# Patient Record
Sex: FEMALE | Race: WHITE | NOT HISPANIC OR LATINO | Employment: FULL TIME | URBAN - METROPOLITAN AREA
[De-identification: names, ages, dates, MRNs, and addresses within clinical notes are randomized per-mention and may not be internally consistent; named-entity substitution may affect disease eponyms.]

---

## 2023-10-12 ENCOUNTER — PATIENT ROUNDING (BHMG ONLY) (OUTPATIENT)
Dept: INTERNAL MEDICINE | Facility: CLINIC | Age: 23
End: 2023-10-12
Payer: COMMERCIAL

## 2023-10-12 ENCOUNTER — OFFICE VISIT (OUTPATIENT)
Dept: INTERNAL MEDICINE | Facility: CLINIC | Age: 23
End: 2023-10-12
Payer: COMMERCIAL

## 2023-10-12 VITALS
RESPIRATION RATE: 18 BRPM | SYSTOLIC BLOOD PRESSURE: 122 MMHG | OXYGEN SATURATION: 98 % | TEMPERATURE: 98.1 F | DIASTOLIC BLOOD PRESSURE: 72 MMHG | BODY MASS INDEX: 30.14 KG/M2 | HEIGHT: 63 IN | HEART RATE: 84 BPM | WEIGHT: 170.13 LBS

## 2023-10-12 DIAGNOSIS — Z3A.11 11 WEEKS GESTATION OF PREGNANCY: Primary | ICD-10-CM

## 2023-10-12 DIAGNOSIS — J30.9 ALLERGIC RHINITIS, UNSPECIFIED SEASONALITY, UNSPECIFIED TRIGGER: ICD-10-CM

## 2023-10-12 PROBLEM — Z82.3 FAMILY HISTORY OF STROKE: Status: ACTIVE | Noted: 2023-10-12

## 2023-10-12 PROBLEM — Z83.3 FAMILY HISTORY OF DIABETES MELLITUS: Status: ACTIVE | Noted: 2023-10-12

## 2023-10-12 NOTE — PROGRESS NOTES
"Chief Complaint  Establish Care (New patient, general check up, currently pregnant and has an appointment scheduled for obgyn. )    Subjective       Elisha Funez presents to [facility]    HPI   Presenting to [facility] care.     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    Currently 11 weeks pregnant. Has appointment with OB, Dr. Hi, on 10/30/23. Will be 13 weeks at OB appointment. No concerns currently regarding pregnancy.         Objective     Vitals:    10/12/23 1030   BP: 122/72   BP Location: Left arm   Patient Position: Sitting   Cuff Size: Adult   Pulse: 84   Resp: 18   Temp: 98.1 øF (36.7 øC)   TempSrc: Temporal   SpO2: 98%   Weight: 77.2 kg (170 lb 2 oz)   Height: 160 cm (63\")      Wt Readings from Last 3 Encounters:   10/12/23 77.2 kg (170 lb 2 oz)   09/13/23 75.3 kg (166 lb)      BP Readings from Last 3 Encounters:   10/12/23 122/72   09/13/23 123/83        Body mass index is 30.14 kg/mý.    BMI is >= 30 and <35. (Class 1 Obesity). The following options were offered after discussion;: exercise counseling/recommendations and nutrition counseling/recommendations       Physical Exam  Vitals reviewed.   Constitutional:       Appearance: Normal appearance. She is well-developed.   HENT:      Head: Normocephalic and atraumatic.      Mouth/Throat:      Pharynx: No oropharyngeal exudate.   Eyes:      Conjunctiva/sclera: Conjunctivae normal.      Pupils: Pupils are equal, round, and reactive to light.   Neck:      Thyroid: No thyromegaly or thyroid tenderness.   Cardiovascular:      Rate and Rhythm: Normal rate and regular rhythm.      Heart sounds: No murmur heard.     No friction rub. No gallop.   Pulmonary:      Effort: Pulmonary effort is normal.      Breath sounds: Normal breath sounds. No wheezing or rhonchi.   Lymphadenopathy:      Cervical: No cervical adenopathy.   Skin:     General: Skin is warm and dry.   Neurological:      Mental Status: She is alert " and oriented to person, place, and time.   Psychiatric:         Mood and Affect: Affect normal.          Result Review :   The following data was reviewed by: Lela Mckee MD on 10/12/2023:        Procedures    Assessment and Plan   Diagnoses and all orders for this visit:    1. 11 weeks gestation of pregnancy (Primary)  Assessment & Plan:  Has initial appointment with OB, Dr. Hi, on 10/30/23.   Counseled to continue prenatal vitamin      2. Allergic rhinitis, unspecified seasonality, unspecified trigger  Assessment & Plan:  Can continue Claritin as needed for symptoms            Follow Up   Return in about 1 year (around 10/12/2024) for Annual physical, or sooner if needed.  Patient was given instructions and counseling regarding her condition or for health maintenance advice. Please see specific information pulled into the AVS if appropriate.

## 2023-10-25 NOTE — PROGRESS NOTES
A My-Chart message has been sent to the patient for [place] [place] [facility]

## 2023-10-30 ENCOUNTER — INITIAL PRENATAL (OUTPATIENT)
Dept: OBSTETRICS AND GYNECOLOGY | Facility: CLINIC | Age: 23
End: 2023-10-30
Payer: COMMERCIAL

## 2023-10-30 VITALS — DIASTOLIC BLOOD PRESSURE: 84 MMHG | WEIGHT: 170 LBS | SYSTOLIC BLOOD PRESSURE: 126 MMHG | BODY MASS INDEX: 30.11 KG/M2

## 2023-10-30 DIAGNOSIS — Z34.00 SUPERVISION OF NORMAL FIRST PREGNANCY, ANTEPARTUM: Primary | ICD-10-CM

## 2023-10-30 DIAGNOSIS — Z83.3 FAMILY HISTORY OF DIABETES MELLITUS: ICD-10-CM

## 2023-10-30 PROBLEM — Z34.80 SUPERVISION OF OTHER NORMAL PREGNANCY, ANTEPARTUM: Status: ACTIVE | Noted: 2023-10-30

## 2023-10-30 LAB
ABO GROUP BLD: NORMAL
B-HCG UR QL: POSITIVE
BASOPHILS # BLD AUTO: 0.04 10*3/MM3 (ref 0–0.2)
BASOPHILS NFR BLD AUTO: 0.3 % (ref 0–1.5)
BLD GP AB SCN SERPL QL: NEGATIVE
C TRACH RRNA CVX QL NAA+PROBE: NOT DETECTED
DEPRECATED RDW RBC AUTO: 39.6 FL (ref 37–54)
EOSINOPHIL # BLD AUTO: 0.25 10*3/MM3 (ref 0–0.4)
EOSINOPHIL NFR BLD AUTO: 2.1 % (ref 0.3–6.2)
ERYTHROCYTE [DISTWIDTH] IN BLOOD BY AUTOMATED COUNT: 13.1 % (ref 12.3–15.4)
EXPIRATION DATE: ABNORMAL
GLUCOSE UR STRIP-MCNC: NEGATIVE MG/DL
HBA1C MFR BLD: 4.6 % (ref 4.8–5.6)
HBV SURFACE AG SERPL QL IA: NORMAL
HCT VFR BLD AUTO: 38.6 % (ref 34–46.6)
HCV AB SER DONR QL: NORMAL
HGB BLD-MCNC: 13.6 G/DL (ref 12–15.9)
HIV1+2 AB SER QL: NORMAL
IMM GRANULOCYTES # BLD AUTO: 0.06 10*3/MM3 (ref 0–0.05)
IMM GRANULOCYTES NFR BLD AUTO: 0.5 % (ref 0–0.5)
INTERNAL NEGATIVE CONTROL: ABNORMAL
INTERNAL POSITIVE CONTROL: ABNORMAL
LYMPHOCYTES # BLD AUTO: 1.58 10*3/MM3 (ref 0.7–3.1)
LYMPHOCYTES NFR BLD AUTO: 13.6 % (ref 19.6–45.3)
Lab: ABNORMAL
MCH RBC QN AUTO: 29.8 PG (ref 26.6–33)
MCHC RBC AUTO-ENTMCNC: 35.2 G/DL (ref 31.5–35.7)
MCV RBC AUTO: 84.5 FL (ref 79–97)
MONOCYTES # BLD AUTO: 0.45 10*3/MM3 (ref 0.1–0.9)
MONOCYTES NFR BLD AUTO: 3.9 % (ref 5–12)
N GONORRHOEA RRNA SPEC QL NAA+PROBE: NOT DETECTED
NEUTROPHILS NFR BLD AUTO: 79.6 % (ref 42.7–76)
NEUTROPHILS NFR BLD AUTO: 9.27 10*3/MM3 (ref 1.7–7)
NRBC BLD AUTO-RTO: 0 /100 WBC (ref 0–0.2)
PLATELET # BLD AUTO: 300 10*3/MM3 (ref 140–450)
PMV BLD AUTO: 10 FL (ref 6–12)
PROT UR STRIP-MCNC: NEGATIVE MG/DL
RBC # BLD AUTO: 4.57 10*6/MM3 (ref 3.77–5.28)
RH BLD: POSITIVE
T PALLIDUM IGG SER QL: NORMAL
WBC NRBC COR # BLD: 11.65 10*3/MM3 (ref 3.4–10.8)

## 2023-10-30 PROCEDURE — 87591 N.GONORRHOEAE DNA AMP PROB: CPT | Performed by: OBSTETRICS & GYNECOLOGY

## 2023-10-30 PROCEDURE — 86780 TREPONEMA PALLIDUM: CPT | Performed by: OBSTETRICS & GYNECOLOGY

## 2023-10-30 PROCEDURE — 80307 DRUG TEST PRSMV CHEM ANLYZR: CPT | Performed by: OBSTETRICS & GYNECOLOGY

## 2023-10-30 PROCEDURE — 86850 RBC ANTIBODY SCREEN: CPT | Performed by: OBSTETRICS & GYNECOLOGY

## 2023-10-30 PROCEDURE — 86900 BLOOD TYPING SEROLOGIC ABO: CPT | Performed by: OBSTETRICS & GYNECOLOGY

## 2023-10-30 PROCEDURE — 86803 HEPATITIS C AB TEST: CPT | Performed by: OBSTETRICS & GYNECOLOGY

## 2023-10-30 PROCEDURE — 85025 COMPLETE CBC W/AUTO DIFF WBC: CPT | Performed by: OBSTETRICS & GYNECOLOGY

## 2023-10-30 PROCEDURE — 87340 HEPATITIS B SURFACE AG IA: CPT | Performed by: OBSTETRICS & GYNECOLOGY

## 2023-10-30 PROCEDURE — G0432 EIA HIV-1/HIV-2 SCREEN: HCPCS | Performed by: OBSTETRICS & GYNECOLOGY

## 2023-10-30 PROCEDURE — 87086 URINE CULTURE/COLONY COUNT: CPT | Performed by: OBSTETRICS & GYNECOLOGY

## 2023-10-30 PROCEDURE — 87491 CHLMYD TRACH DNA AMP PROBE: CPT | Performed by: OBSTETRICS & GYNECOLOGY

## 2023-10-30 PROCEDURE — 86901 BLOOD TYPING SEROLOGIC RH(D): CPT | Performed by: OBSTETRICS & GYNECOLOGY

## 2023-10-30 PROCEDURE — 83036 HEMOGLOBIN GLYCOSYLATED A1C: CPT | Performed by: OBSTETRICS & GYNECOLOGY

## 2023-10-30 NOTE — ASSESSMENT & PLAN NOTE
MACEY finalize:Estimated Date of Delivery: 5/3/24 based on LMP, confirmed with BSUS at new OB      Genetic testing (NIPS-Quad)/CF/AFP:  ordered    COVID: received, needs booster  Flu: received   Tdap:script 27-36 weeks  RSV: 32-36 weeks    Rhogam    Sterilization:    Anatomy US:ordered  FU US:    EPDS: 6, 0 on #10    PROBLEM LIST/PLAN:   Nulliparity   Family hx Type 2 DM-mother, early GTT recommended

## 2023-10-30 NOTE — PROGRESS NOTES
OBSTETRIC HISTORY AND PHYSICAL     Subjective:  Elisha Patten is a 23 y.o.  at 13w3d  here for her new OB visit. Patient pregnancy is dated by an LMP and confirmed. Patient's pregnancy is complicated by nulliparity.   She is taking her prenatal vitamins.Reports no loss of fluid or vaginal bleeding.No hx of genetic, bleeding, endocrine, chromosome disorder in both patient and partner. No history of multiple gestations, congenital anomalies or mental retardation.    FOB involvement: yes  Pediatrician: yes  Breast/Bottle: undecided   Epidural:  no   Discussed adequate water intake, food guidelines/weight gain, limit caffiene to less than 200mg daily.   Discussed food, activities to avoid. Discussed seatbelt safety.   Reviewed safe meds in pregnancy handout.  Taking PNV: yes  Smoking cessation needed:  no     Reviewed and updated:  OBHx, GYNHx (STDs), PMHx, Medications, Allergies, PSHx, Social Hx, Preventative Hx (PAP), Hx of abuse/safe environment, Vaccine Hx including hx of chickenpox or vaccine, Genetic Hx (pt, FOB, both families).        OB History    Para Term  AB Living   1             SAB IAB Ectopic Molar Multiple Live Births                    # Outcome Date GA Lbr [place]/2nd Weight Sex Delivery Anes PTL Lv   1 Current              Past Medical History:   Diagnosis Date    Allergic     Seasonal allergies     History reviewed. No pertinent surgical history.  Family History   Problem Relation Age of Onset    Hyperlipidemia Father     Diabetes Mother         DMT2    No Known Problems Brother     No Known Problems Sister     No Known Problems Son     No Known Problems Daughter     No Known Problems Paternal Grandfather     No Known Problems Paternal Grandmother     Hyperlipidemia Maternal Grandmother     Hyperlipidemia Maternal Grandfather     Diabetes Paternal Uncle         DMT2    Diabetes Paternal Uncle         DMT1    No Known Problems Cousin     No Known Problems Other     Rheum arthritis Neg  Hx     Osteoarthritis Neg Hx     Asthma Neg Hx     Heart failure Neg Hx     Hypertension Neg Hx     Migraines Neg Hx     Rashes / Skin problems Neg Hx     Seizures Neg Hx     Stroke Neg Hx     Thyroid disease Neg Hx     Breast cancer Neg Hx     Uterine cancer Neg Hx     Ovarian cancer Neg Hx     Colon cancer Neg Hx      Allergies   Allergen Reactions    Penicillins Unknown - High Severity     Pt reports father is allergic to pencillin     Social History     Socioeconomic History    Marital status:    Tobacco Use    Smoking status: Never     Passive exposure: Never    Smokeless tobacco: Never   Vaping Use    Vaping Use: Never used   Substance and Sexual Activity    Alcohol use: Not Currently    Drug use: Never    Sexual activity: Yes     Partners: Male     Birth control/protection: None     Comment:            ROS:  General ROS: negative for - chills or fatigue  Respiratory ROS: negative for - cough or hemoptysis  Cardiovascular ROS: negative for - chest pain or dyspnea on exertion  Gastrointestinal ROS: negative for - abdominal pain or appetite loss  Musculoskeletal ROS: negative for - gait disturbance or joint pain  Neurological ROS: negative for - behavioral changes or bowel and bladder control changes  Dermatological ROS: negative for rashes or lesions     Objective:  Physical Exam:   Vitals:    10/30/23 1458   BP: 126/84       General appearance - alert, well appearing, and in no distress  Mental status - alert, oriented to person, place, and time  Neck- Supple.  No nodularity or enlargement.  Heart- Regular rate and rhythm without murmur, gallop or rub.  Lungs- Clear to auscultation bilaterally, negative W/R/R  Breasts- Deferred to annual  Abdomen- Soft, Gravid uterus, non-tender  Extremeties: Normal ROM, Negative swelling or cyanosis  Neurological: Gait normal, Negative tingling or loss of sensation     +FHTs BSUS     Counseling:   Nutrition discussed, calories, activity/exercise in  pregnancy  Discussed dietary restrictions/safety food preparation in pregnancy  Reviewed what to expect prenatal visits, office providers (female and male) and covering [facility]/Dr. Scott  Appropriate trimester precautions provided, N/V, vag bleeding, cramping  VACCINE importance in pregnancy discussed.  Maternal and fetal risk of not being vaccinated reviewed NLT increased risk maternal/fetal severity of illness/death, PTD, CS, hemorrhage, HTN, possible IUFD.  Significant maternal and fetal/infant benefit w vaccination.  FDA approval and ACOG/SMFM/CDC strong recommendation in pregnancy.  Questions answered.   Questions answered  ANXIETY/DEPRESSION- We discussed treatment options R/B/A/SE/E expectant, THERAPY, SSRI, vs SSRI + Therapy.  Non medical options usually recommended first.  MERRICK reviewed.  Ideally weaning in third tri if possible. Some studies indicate child w increased risk Dep/Anx w SSRI use in preg.  Black box warning.  Recommend avoid abrupt discontinuation.  Discussed healthy weight gain.  Also discussed increased water intake, 200mg of caffeine daily, exercise and healthy eating habits.   Encouraged patient to consider breastfeeding. Discussed that it is recommended by the CDC and WHO that women exclusively breastfeed for the first 6 months and continue to breastfeed up to one year. Discussed the health benefits of breastfeeding, including increased immune systems in infants, reduced risk of food allergies, and reduced risk of chronic disease as an adult. Maternal benefits include more PP weight loss, reduced risk of PP depression, breast/ovarian cancer risk reduced.     ASSESSMENT/PLAN:   Diagnoses and all orders for this visit:    1. Supervision of normal first pregnancy, antepartum (Primary)  -     Hemoglobin A1c  -     Hemoglobinopathy Fractionation Cascade  -     IGP,CtNgTv,rfx Aptima HPV ASCU  -     OB Panel With HIV  -     POC Pregnancy, Urine  -     POC Urinalysis Dipstick  -     Urine  Culture - Urine, Urine, Clean Catch  -     Urine Drug Screen - Urine, Clean Catch  -     US Ob 14 + Weeks Single or First Gestation; Future        Problems Addressed this Visit    None  Visit Diagnoses       Supervision of normal first pregnancy, antepartum    -  Primary    Relevant Orders    Hemoglobin A1c    Hemoglobinopathy Fractionation Cascade    IGP,CtNgTv,rfx Aptima HPV ASCU    OB Panel With HIV    POC Pregnancy, Urine    POC Urinalysis Dipstick    Urine Culture - Urine, Urine, Clean Catch    Urine Drug Screen - Urine, Clean Catch    US Ob 14 + Weeks Single or First Gestation          Diagnoses         Codes Comments    Supervision of normal first pregnancy, antepartum    -  Primary ICD-10-CM: Z34.00  ICD-9-CM: V22.0                     Return in about 4 weeks (around 11/27/2023) for Routine OB visit.    We have gone over the expected prenatal care to include the timing and content of visits including the anatomy ultrasound.  We discussed the content of the anatomy ultrasound and its limitations (the fact that ultrasound in general may not see up to 40% of abnormalities).  I informed her how to contact the office and/or on call person in the event of any problems and encouraged her to do so when she feels it is necessary.  We then spent time answering her questions which she indicated were answered to her satisfaction.    Audrey Hi DO  10/30/2023 15:04 EDT

## 2023-10-31 LAB
AMPHET+METHAMPHET UR QL: NEGATIVE
BACTERIA SPEC AEROBE CULT: NO GROWTH
BARBITURATES UR QL SCN: NEGATIVE
BENZODIAZ UR QL SCN: NEGATIVE
CANNABINOIDS SERPL QL: NEGATIVE
COCAINE UR QL: NEGATIVE
FENTANYL UR-MCNC: NEGATIVE NG/ML
METHADONE UR QL SCN: NEGATIVE
OPIATES UR QL: NEGATIVE
OXYCODONE UR QL SCN: NEGATIVE

## 2023-11-01 PROBLEM — B06.9: Status: ACTIVE | Noted: 2023-11-01

## 2023-11-01 PROBLEM — O98.519: Status: ACTIVE | Noted: 2023-11-01

## 2023-11-01 LAB
HGB A MFR BLD ELPH: 97.2 % (ref 96.4–98.8)
HGB A2 MFR BLD ELPH: 2.8 % (ref 1.8–3.2)
HGB F MFR BLD ELPH: 0 % (ref 0–2)
HGB FRACT BLD-IMP: NORMAL
HGB S MFR BLD ELPH: 0 %
RUBV IGG SERPL IA-ACNC: <0.9 INDEX

## 2023-11-07 LAB
CITATION REF LAB TEST: NORMAL
ETHNIC BACKGROUND STATED: NORMAL
GENE DIS ANL CARRIER INTERP-IMP: NORMAL
GENE STUDIED ID: NORMAL
LAB DIRECTOR NAME PROVIDER: NORMAL
Lab: NORMAL
MOL DX INTERP BLD/T QL: NORMAL
REASON FOR REFERRAL (NARRATIVE): NORMAL
RECOMMENDATION PATIENT DOC-IMP: NORMAL
REF LAB TEST METHOD: NORMAL
SERVICE CMNT-IMP: NORMAL
SPECIMEN SOURCE: NORMAL

## 2023-11-25 NOTE — PROGRESS NOTES
Routine Prenatal Visit     Subjective  Elisha Patten is a 23 y.o.  at 17w3d here for her routine OB visit.   She is taking her prenatal vitamins.Reports no loss of fluid or vaginal bleeding. Patient doing well without any complaints. Pregnancy is complicated by:     Patient Active Problem List   Diagnosis    Family history of diabetes mellitus    Family history of stroke    Allergic rhinitis    11 weeks gestation of pregnancy    Supervision of other normal pregnancy, antepartum    Rubella nonimmune         OB History    Para Term  AB Living   1             SAB IAB Ectopic Molar Multiple Live Births                    # Outcome Date GA Lbr [place]/2nd Weight Sex Delivery Anes PTL Lv   1 Current                    ROS:   General ROS: negative for - chills or fatigue  Respiratory ROS: negative for - cough or hemoptysis  Cardiovascular ROS: negative for - chest pain or dyspnea on exertion  Genito-Urinary ROS: negative for  change in urinary stream, vaginal discharge   Musculoskeletal ROS: negative for - gait disturbance or joint pain  Dermatological ROS: negative for acne,  dry skin or itching    Objective  Physical Exam:   Vitals:    23 1131   BP: 116/70       :   FHT: 110-160 BPM    General appearance - alert, well appearing, and in no distress  Mental status - alert, oriented to person, place, and time  Abdomen- Soft, Gravid uterus, non-tender to palpation  Musculoskeletal: negative for - gait disturbance or joint pain  Extremeties: negative swelling or cyanosis   Dermatological: negative rashes or skin lesions       Assessment/Plan:   Diagnoses and all orders for this visit:    1. Supervision of normal first pregnancy, antepartum (Primary)  -     POC Urinalysis Dipstick    2. Rubella nonimmune    3. Supervision of other normal pregnancy, antepartum  Assessment & Plan:  PROBLEM LIST/PLAN:   Nulliparity   Family hx Type 2 DM-mother, early GTT recommended and ordered  Rubella nonimmune-MMR  PP            Counseling:   Second trimester precautions  Round Ligament Pain:  The uterus has several ligaments which provide support and keep the uterus in place. As the  uterus grows these ligaments are pulled and stretched which often causes sharp stabbing like pain in the inguinal area.   You may find a pregnancy support band helpful. Changing positions may also help. Yoga is a great way to cope with round ligament and low back pain in pregnancy.    Massage may also help with low back pain   Things to Consider at this Point in your Pregnancy:  Some women experience swelling in their feet during pregnancy. Compression stockings may help  Drink plenty of water and stay active   Make sure you are eating frequent small meals, nuts are a wonderful snack to keep with you            Return in about 4 weeks (around 12/25/2023) for Routine OB visit.      We have gone over prenatal care to include the timing and content of visits. I informed her how to contact the office and/or on call person in the event of any problems and encouraged her to do so when she feels it is necessary.  We then spent time answering her questions which she indicated were answered to her satisfaction.    Audrey Hi DO  11/27/2023 11:47 EST

## 2023-11-25 NOTE — ASSESSMENT & PLAN NOTE
PROBLEM LIST/PLAN:   Nulliparity   Family hx Type 2 DM-mother, early GTT recommended and ordered  Rubella nonimmune-MMR PP

## 2023-11-27 ENCOUNTER — TELEPHONE (OUTPATIENT)
Dept: OBSTETRICS AND GYNECOLOGY | Facility: CLINIC | Age: 23
End: 2023-11-27
Payer: COMMERCIAL

## 2023-11-27 ENCOUNTER — ROUTINE PRENATAL (OUTPATIENT)
Dept: OBSTETRICS AND GYNECOLOGY | Facility: CLINIC | Age: 23
End: 2023-11-27
Payer: COMMERCIAL

## 2023-11-27 VITALS — WEIGHT: 172.2 LBS | SYSTOLIC BLOOD PRESSURE: 116 MMHG | DIASTOLIC BLOOD PRESSURE: 70 MMHG | BODY MASS INDEX: 30.5 KG/M2

## 2023-11-27 DIAGNOSIS — O98.519: ICD-10-CM

## 2023-11-27 DIAGNOSIS — B06.9: ICD-10-CM

## 2023-11-27 DIAGNOSIS — Z34.00 SUPERVISION OF NORMAL FIRST PREGNANCY, ANTEPARTUM: Primary | ICD-10-CM

## 2023-11-27 DIAGNOSIS — Z34.80 SUPERVISION OF OTHER NORMAL PREGNANCY, ANTEPARTUM: ICD-10-CM

## 2023-11-27 LAB
GLUCOSE 1H P GLC SERPL-MCNC: 89 MG/DL (ref 65–139)
GLUCOSE UR STRIP-MCNC: NEGATIVE MG/DL
PROT UR STRIP-MCNC: NEGATIVE MG/DL

## 2023-11-27 PROCEDURE — 36415 COLL VENOUS BLD VENIPUNCTURE: CPT | Performed by: OBSTETRICS & GYNECOLOGY

## 2023-11-27 PROCEDURE — 82950 GLUCOSE TEST: CPT | Performed by: OBSTETRICS & GYNECOLOGY

## 2023-11-27 PROCEDURE — 0502F SUBSEQUENT PRENATAL CARE: CPT | Performed by: OBSTETRICS & GYNECOLOGY

## 2023-11-27 NOTE — PATIENT INSTRUCTIONS
Venipuncture Blood Specimen Collection  Venipuncture performed in right arm by Yris Rome with good hemostasis. Patient tolerated the procedure well without complications.   11/27/23   Yris Rome     Patient's first and last name, , procedure, and correct site confirmed prior to the start of procedure. Patient's first and last name, , procedure, and correct site confirmed prior to the start of procedure. Patient's first and last name, , procedure, and correct site confirmed prior to the start of procedure.

## 2023-12-28 ENCOUNTER — ROUTINE PRENATAL (OUTPATIENT)
Dept: OBSTETRICS AND GYNECOLOGY | Facility: CLINIC | Age: 23
End: 2023-12-28
Payer: COMMERCIAL

## 2023-12-28 VITALS — BODY MASS INDEX: 31.11 KG/M2 | WEIGHT: 175.6 LBS | SYSTOLIC BLOOD PRESSURE: 112 MMHG | DIASTOLIC BLOOD PRESSURE: 76 MMHG

## 2023-12-28 DIAGNOSIS — O98.519: ICD-10-CM

## 2023-12-28 DIAGNOSIS — Z34.80 SUPERVISION OF OTHER NORMAL PREGNANCY, ANTEPARTUM: Primary | ICD-10-CM

## 2023-12-28 DIAGNOSIS — B06.9: ICD-10-CM

## 2023-12-28 LAB
GLUCOSE UR STRIP-MCNC: NEGATIVE MG/DL
PROT UR STRIP-MCNC: NEGATIVE MG/DL

## 2023-12-28 PROCEDURE — 0502F SUBSEQUENT PRENATAL CARE: CPT | Performed by: OBSTETRICS & GYNECOLOGY

## 2023-12-28 NOTE — PROGRESS NOTES
Routine Prenatal Visit     Subjective  Elisha Patten is a 23 y.o.  at 21w6d here for her routine OB visit.   She is taking her prenatal vitamins.Reports no loss of fluid or vaginal bleeding. Patient doing well without any complaints. Pregnancy is complicated by:     Patient Active Problem List   Diagnosis    Family history of diabetes mellitus    Family history of stroke    Allergic rhinitis    11 weeks gestation of pregnancy    Supervision of other normal pregnancy, antepartum    Rubella nonimmune         OB History    Para Term  AB Living   1             SAB IAB Ectopic Molar Multiple Live Births                    # Outcome Date GA Lbr [place]/2nd Weight Sex Delivery Anes PTL Lv   1 Current                    ROS:   General ROS: negative for - chills or fatigue  Respiratory ROS: negative for - cough or hemoptysis  Cardiovascular ROS: negative for - chest pain or dyspnea on exertion  Genito-Urinary ROS: negative for  change in urinary stream, vaginal discharge   Musculoskeletal ROS: negative for - gait disturbance or joint pain  Dermatological ROS: negative for acne,  dry skin or itching    Objective  Physical Exam:   Vitals:    23 1110   BP: 112/76       Uterine Size: not examined, US today  FHT: 110-160 BPM    General appearance - alert, well appearing, and in no distress  Mental status - alert, oriented to person, place, and time  Abdomen- Soft, Gravid uterus, non-tender to palpation  Musculoskeletal: negative for - gait disturbance or joint pain  Extremeties: negative swelling or cyanosis   Dermatological: negative rashes or skin lesions     Assessment/Plan:   Diagnoses and all orders for this visit:    1. Supervision of other normal pregnancy, antepartum (Primary)  Assessment & Plan:  PROBLEM LIST/PLAN:   Nulliparity   Family hx Type 2 DM-mother, early GTT normal   Rubella nonimmune-MMR PP    Orders:  -     POC Urinalysis Dipstick  -     US Ob 14 + Weeks Single or First Gestation;  Future    2. Rubella nonimmune          Counseling:   Second trimester precautions  Round Ligament Pain:  The uterus has several ligaments which provide support and keep the uterus in place. As the  uterus grows these ligaments are pulled and stretched which often causes sharp stabbing like pain in the inguinal area.   You may find a pregnancy support band helpful. Changing positions may also help. Yoga is a great way to cope with round ligament and low back pain in pregnancy.    Massage may also help with low back pain   Things to Consider at this Point in your Pregnancy:  Some women experience swelling in their feet during pregnancy. Compression stockings may help  Drink plenty of water and stay active   Make sure you are eating frequent small meals, nuts are a wonderful snack to keep with you            Return in about 4 weeks (around 1/25/2024) for Routine OB visit.      We have gone over prenatal care to include the timing and content of visits. I informed her how to contact the office and/or on call person in the event of any problems and encouraged her to do so when she feels it is necessary.  We then spent time answering her questions which she indicated were answered to her satisfaction.    Audrey Hi DO  12/28/2023 12:10 EST

## 2023-12-28 NOTE — ASSESSMENT & PLAN NOTE
PROBLEM LIST/PLAN:   Nulliparity   Family hx Type 2 DM-mother, early GTT normal   Rubella nonimmune-MMR PP

## 2024-01-15 ENCOUNTER — TELEPHONE (OUTPATIENT)
Dept: OBSTETRICS AND GYNECOLOGY | Facility: CLINIC | Age: 24
End: 2024-01-15
Payer: COMMERCIAL

## 2024-01-15 NOTE — TELEPHONE ENCOUNTER
Caller: Elisha Patten    Relationship to patient: Self    Best call back number: [phone] (home)        OB PT 24 WKS GEST IS ASKING IF SHE CAN TAKE MUCINEX OR ANY OVER THE COUNTER DECONGESTANTS  AND OVER THE COUNTER MEDS FOR SORE THROAT.        OKAY TO MESSAGE VIA [employer].

## 2024-01-26 ENCOUNTER — ROUTINE PRENATAL (OUTPATIENT)
Dept: OBSTETRICS AND GYNECOLOGY | Facility: CLINIC | Age: 24
End: 2024-01-26
Payer: COMMERCIAL

## 2024-01-26 VITALS — WEIGHT: 181.4 LBS | DIASTOLIC BLOOD PRESSURE: 80 MMHG | BODY MASS INDEX: 32.13 KG/M2 | SYSTOLIC BLOOD PRESSURE: 110 MMHG

## 2024-01-26 DIAGNOSIS — Z3A.11 11 WEEKS GESTATION OF PREGNANCY: ICD-10-CM

## 2024-01-26 DIAGNOSIS — B06.9: ICD-10-CM

## 2024-01-26 DIAGNOSIS — O98.519: ICD-10-CM

## 2024-01-26 DIAGNOSIS — Z34.80 SUPERVISION OF OTHER NORMAL PREGNANCY, ANTEPARTUM: ICD-10-CM

## 2024-01-26 DIAGNOSIS — Z34.00 SUPERVISION OF NORMAL FIRST PREGNANCY, ANTEPARTUM: Primary | ICD-10-CM

## 2024-01-26 LAB
DEPRECATED RDW RBC AUTO: 42.1 FL (ref 37–54)
ERYTHROCYTE [DISTWIDTH] IN BLOOD BY AUTOMATED COUNT: 13 % (ref 12.3–15.4)
GLUCOSE 1H P GLC SERPL-MCNC: 103 MG/DL (ref 65–139)
GLUCOSE UR STRIP-MCNC: NEGATIVE MG/DL
HCT VFR BLD AUTO: 36.8 % (ref 34–46.6)
HGB BLD-MCNC: 12.5 G/DL (ref 12–15.9)
MCH RBC QN AUTO: 29.9 PG (ref 26.6–33)
MCHC RBC AUTO-ENTMCNC: 34 G/DL (ref 31.5–35.7)
MCV RBC AUTO: 88 FL (ref 79–97)
PLATELET # BLD AUTO: 311 10*3/MM3 (ref 140–450)
PMV BLD AUTO: 10 FL (ref 6–12)
PROT UR STRIP-MCNC: NEGATIVE MG/DL
RBC # BLD AUTO: 4.18 10*6/MM3 (ref 3.77–5.28)
WBC NRBC COR # BLD AUTO: 14.88 10*3/MM3 (ref 3.4–10.8)

## 2024-01-26 PROCEDURE — 82950 GLUCOSE TEST: CPT | Performed by: OBSTETRICS & GYNECOLOGY

## 2024-01-26 PROCEDURE — 85027 COMPLETE CBC AUTOMATED: CPT | Performed by: OBSTETRICS & GYNECOLOGY

## 2024-01-26 NOTE — PATIENT INSTRUCTIONS
Venipuncture Blood Specimen Collection  Venipuncture performed in left arm by Samanta Castro with good hemostasis. Patient tolerated the procedure well without complications.   01/26/24   Samanta Castro

## 2024-01-26 NOTE — PROGRESS NOTES
Routine Prenatal Visit     Subjective  Elisha Patten is a 23 y.o.  at 26w0d here for her routine OB visit.   She is taking her prenatal vitamins.Reports no loss of fluid or vaginal bleeding. Patient doing well. Does complaining of a swollen lymph node in her left axilla. Per palpations small pea sized area, pt told to monitor and to notify if enlarging or increased pain. Pregnancy is complicated by:     Patient Active Problem List   Diagnosis    Family history of diabetes mellitus    Family history of stroke    Allergic rhinitis    11 weeks gestation of pregnancy    Supervision of other normal pregnancy, antepartum    Rubella nonimmune         OB History    Para Term  AB Living   1             SAB IAB Ectopic Molar Multiple Live Births                    # Outcome Date GA Lbr [place]/2nd Weight Sex Delivery Anes PTL Lv   1 Current                    ROS:   General ROS: negative for - chills or fatigue  Respiratory ROS: negative for - cough or hemoptysis  Cardiovascular ROS: negative for - chest pain or dyspnea on exertion  Genito-Urinary ROS: negative for  change in urinary stream, vaginal discharge   Musculoskeletal ROS: negative for - gait disturbance or joint pain  Dermatological ROS: negative for acne,  dry skin or itching    Objective  Physical Exam:   Vitals:    24 1349   BP: 110/80       Uterine Size: not examined, US today  FHT: 110-160 BPM    General appearance - alert, well appearing, and in no distress  Mental status - alert, oriented to person, place, and time  Abdomen- Soft, Gravid uterus, non-tender to palpation  Musculoskeletal: negative for - gait disturbance or joint pain  Extremeties: negative swelling or cyanosis   Dermatological: negative rashes or skin lesions       Assessment/Plan:   Diagnoses and all orders for this visit:    1. Supervision of normal first pregnancy, antepartum (Primary)  -     POC Urinalysis Dipstick  -     Gestational Diabetes Screen 1 Hour  -      CBC (No Diff)    2. Rubella nonimmune    3. 11 weeks gestation of pregnancy    4. Supervision of other normal pregnancy, antepartum  Assessment & Plan:  PROBLEM LIST/PLAN:   Nulliparity   Family hx Type 2 DM-mother, early GTT normal   Rubella nonimmune-MMR PP            Counseling:   Second trimester precautions  Round Ligament Pain:  The uterus has several ligaments which provide support and keep the uterus in place. As the  uterus grows these ligaments are pulled and stretched which often causes sharp stabbing like pain in the inguinal area.   You may find a pregnancy support band helpful. Changing positions may also help. Yoga is a great way to cope with round ligament and low back pain in pregnancy.    Massage may also help with low back pain   Things to Consider at this Point in your Pregnancy:  Some women experience swelling in their feet during pregnancy. Compression stockings may help  Drink plenty of water and stay active   Make sure you are eating frequent small meals, nuts are a wonderful snack to keep with you            Return in about 4 weeks (around 2/23/2024) for Routine OB visit.      We have gone over prenatal care to include the timing and content of visits. I informed her how to contact the office and/or on call person in the event of any problems and encouraged her to do so when she feels it is necessary.  We then spent time answering her questions which she indicated were answered to her satisfaction.    Audrey iH DO  1/26/2024 13:58 EST

## 2024-02-19 ENCOUNTER — ROUTINE PRENATAL (OUTPATIENT)
Dept: OBSTETRICS AND GYNECOLOGY | Facility: CLINIC | Age: 24
End: 2024-02-19
Payer: COMMERCIAL

## 2024-02-19 VITALS — WEIGHT: 189 LBS | BODY MASS INDEX: 33.48 KG/M2 | DIASTOLIC BLOOD PRESSURE: 78 MMHG | SYSTOLIC BLOOD PRESSURE: 107 MMHG

## 2024-02-19 DIAGNOSIS — O98.519: ICD-10-CM

## 2024-02-19 DIAGNOSIS — Z34.80 SUPERVISION OF OTHER NORMAL PREGNANCY, ANTEPARTUM: Primary | ICD-10-CM

## 2024-02-19 DIAGNOSIS — B06.9: ICD-10-CM

## 2024-02-19 PROBLEM — Z3A.11 11 WEEKS GESTATION OF PREGNANCY: Status: RESOLVED | Noted: 2023-10-12 | Resolved: 2024-02-19

## 2024-02-19 LAB
GLUCOSE UR STRIP-MCNC: NEGATIVE MG/DL
PROT UR STRIP-MCNC: NEGATIVE MG/DL

## 2024-02-19 PROCEDURE — 0502F SUBSEQUENT PRENATAL CARE: CPT | Performed by: OBSTETRICS & GYNECOLOGY

## 2024-02-19 NOTE — PROGRESS NOTES
Routine Prenatal Visit     Subjective  Elisha Patten is a 24 y.o.  at 29w3d here for her routine OB visit.   She is taking her prenatal vitamins.Reports no loss of fluid or vaginal bleeding. Patient doing well without any complaints. Pregnancy complicated by:     Patient Active Problem List   Diagnosis    Family history of diabetes mellitus    Family history of stroke    Allergic rhinitis    Supervision of other normal pregnancy, antepartum    Rubella nonimmune         OB History    Para Term  AB Living   1             SAB IAB Ectopic Molar Multiple Live Births                    # Outcome Date GA Lbr [place]/2nd Weight Sex Delivery Anes PTL Lv   1 Current                    ROS:   General ROS: negative for - chills or fatigue  Respiratory ROS: negative for - cough or hemoptysis  Cardiovascular ROS: negative for - chest pain or dyspnea on exertion  Genito-Urinary ROS: negative for  change in urinary stream, vaginal discharge   Musculoskeletal ROS: negative for - gait disturbance or joint pain  Dermatological ROS: negative for acne,  dry skin or itching    Objective  Physical Exam:   Vitals:    24 1551   BP: 107/78       Uterine Size: size equals dates  FHT: 110-160 BPM    General appearance - alert, well appearing, and in no distress  Mental status - alert, oriented to person, place, and time  Abdomen- Soft, Gravid uterus, non-tender to palpation  Musculoskeletal: negative for - gait disturbance or joint pain  Extremeties: negative swelling or cyanosis   Dermatological: negative rashes or skin lesions       Assessment/Plan:   Diagnoses and all orders for this visit:    1. Supervision of other normal pregnancy, antepartum (Primary)  Assessment & Plan:  PROBLEM LIST/PLAN:   Nulliparity   Family hx Type 2 DM-mother, early GTT normal   Rubella nonimmune-MMR PP    Orders:  -     POC Urinalysis Dipstick    2. Rubella nonimmune            Counseling:   OB precautions, leaking, VB, meche ardon vs  PTL/Labor  FKC  HTN precautions reviewed: HA, vision change, RUQ/epigastric pain, edema  Round Ligament Pain:  The uterus has several ligaments which provide support and keep the uterus in place. As the  uterus grows these ligaments are pulled and stretched which often causes sharp stabbing like pain in the inguinal area.   You may find a pregnancy support band helpful. Changing positions may also help. Yoga is a great way to cope with round ligament and low back pain in pregnancy.    Massage may also help with low back pain   Things to Consider at this Point in your Pregnancy:  Some women experience swelling in their feet during pregnancy. Compression stockings may help  Drink plenty of water and stay active   Make sure you are eating frequent small meals, nuts are a wonderful snack to keep with you            Return in about 2 weeks (around 3/4/2024) for Routine OB visit.      We have gone over prenatal care to include the timing and content of visits. I informed her how to contact the office and/or on call person in the event of any problems and encouraged her to do so when she feels it is necessary.  We then spent time answering her questions which she indicated were answered to her satisfaction.    Audrey Hi DO  2/19/2024 15:59 EST

## 2024-03-07 ENCOUNTER — TELEPHONE (OUTPATIENT)
Dept: OBSTETRICS AND GYNECOLOGY | Facility: CLINIC | Age: 24
End: 2024-03-07

## 2024-03-07 NOTE — TELEPHONE ENCOUNTER
Left message. Need to reschedule appt today with Dr. Saunders. Can put on his schedule for tomorrow 3/8.

## 2024-03-08 ENCOUNTER — ROUTINE PRENATAL (OUTPATIENT)
Dept: OBSTETRICS AND GYNECOLOGY | Facility: CLINIC | Age: 24
End: 2024-03-08
Payer: COMMERCIAL

## 2024-03-08 VITALS — WEIGHT: 192 LBS | SYSTOLIC BLOOD PRESSURE: 125 MMHG | DIASTOLIC BLOOD PRESSURE: 82 MMHG | BODY MASS INDEX: 34.01 KG/M2

## 2024-03-08 DIAGNOSIS — Z34.80 SUPERVISION OF OTHER NORMAL PREGNANCY, ANTEPARTUM: Primary | ICD-10-CM

## 2024-03-08 LAB
GLUCOSE UR STRIP-MCNC: NEGATIVE MG/DL
PROT UR STRIP-MCNC: NEGATIVE MG/DL

## 2024-03-08 NOTE — PROGRESS NOTES
OB FOLLOW UP  Complaint   Chief Complaint   Patient presents with    Routine Prenatal Visit            Elisha Patten is a 24 y.o.  32w0d patient being seen today for her obstetrical follow up visit. Patient denies decreased fetal movement, contractions, loss of fluid or vaginal bleeding.  No acute complaints.  Needs Tdap prescription.    Her prenatal care is complicated by (and status) :    Patient Active Problem List   Diagnosis    Family history of diabetes mellitus    Family history of stroke    Allergic rhinitis    Supervision of other normal pregnancy, antepartum    Rubella nonimmune       All other systems reviewed and are negative.     The additional following portions of the patient's history were reviewed and updated as appropriate: allergies, current medications, past family history, past medical history, past social history, past surgical history, and problem list.      EXAM:     Vital signs: /82   Wt 87.1 kg (192 lb)   LMP 2023 (Exact Date)   BMI 34.01 kg/m²   Appearance/psychiatric: To be in no distress  Constitutional: The patient is well nourished.  Cardiovascular: She does not have edema.  Respiratory: Respiratory effort is normal.  Gastrointestinal: Abdomen is soft, gravid, nontender, no rashes, heart tones are present, fundal height is size equals dates    Pelvic Exam: No    Urine glucose/protein: See prenatal flowsheet       Assessment and Plan    Problem List Items Addressed This Visit          Gravid and     Supervision of other normal pregnancy, antepartum - Primary    Overview     MACEY finalize:Estimated Date of Delivery: 5/3/24 based on LMP, confirmed with BSUS at new OB      Genetic testing (NIPS-Quad)/CF/AFP:  cfDNA_neg, XX, CF/SMA-neg    COVID: received, needs booster  Flu: received   Tdap:script 27-36 weeks  RSV: 32-36 weeks    Rhogam: A positive     Sterilization:none     Anatomy US:growth 29%, AC 35%, consistent with dating, all anatomy seen today and WNL,  mild renal pelvis dilation of rigjt side at 4mm, normal is less than 4mm, anterior placenta-grade 1, 3VC with normal insertion, FHTs 145, vertex,female   FU US:No renal dilation noted, Growth 23%, AC 53%, GENEVIEVE 17cm, Anterior placenta-grade 1, breech, FHTS 136  EPDS: 6, 0 on #10    PROBLEM LIST/PLAN:   Nulliparity   Family hx Type 2 DM-mother, early GTT normal   Rubella nonimmune-MMR PP         Relevant Orders    POC Urinalysis Dipstick (Completed)       Impression  Pregnancy at 32w0d  Fetal status reassuring.   Activity and Exercise discussed.    Plan  Tdap Rx provided  Continue with routine prenatal care  Return to office in 2 weeks      Patient was counseled to the following pregnancy precautions:  Decreased fetal movement, if concern for decreased fetal movement please perform fetal kick counts you are looking for 10 movements in 2 hours.  If concern for fetal movement and not meeting that criteria, please present to triage for evaluation.  Contractions occurring every 5 minutes for over an hour, lasting 30 to 60 seconds and progressively causing more discomfort, please seek medical attention to rule out labor  If you believe that your water is broken, place a sanitary pad.  If pad fills in short period of time i.e. less than 5 minutes, take off pad placed another pad.  If this is saturated please present for rule out rupture of membranes  Vaginal bleeding can be normal in pregnancy, this usually takes a form of spotting.  If having heavier bleeding like a menstrual period please present for evaluation; especially in light of severe abdominal pain this could represent a placental abruption.  Keep all scheduled appointments as recommended.        Arsenio Saunders MD  03/08/2024

## 2024-03-26 ENCOUNTER — HOSPITAL ENCOUNTER (OUTPATIENT)
Facility: HOSPITAL | Age: 24
Discharge: HOME | End: 2024-03-26
Attending: OBSTETRICS & GYNECOLOGY | Admitting: OBSTETRICS & GYNECOLOGY
Payer: COMMERCIAL

## 2024-03-26 VITALS — DIASTOLIC BLOOD PRESSURE: 66 MMHG | HEART RATE: 95 BPM | SYSTOLIC BLOOD PRESSURE: 110 MMHG | OXYGEN SATURATION: 98 %

## 2024-03-26 LAB
ALBUMIN SERPL-MCNC: 3.8 G/DL (ref 3.5–5.2)
ALBUMIN/GLOB SERPL: 1.2 G/DL
ALP SERPL-CCNC: 110 U/L (ref 39–117)
ALT SERPL W P-5'-P-CCNC: 20 U/L (ref 1–33)
ANION GAP SERPL CALCULATED.3IONS-SCNC: 14.8 MMOL/L (ref 5–15)
AST SERPL-CCNC: 19 U/L (ref 1–32)
BASOPHILS # BLD AUTO: 0.08 10*3/MM3 (ref 0–0.2)
BASOPHILS NFR BLD AUTO: 0.5 % (ref 0–1.5)
BILIRUB SERPL-MCNC: <0.2 MG/DL (ref 0–1.2)
BUN SERPL-MCNC: 7 MG/DL (ref 6–20)
BUN/CREAT SERPL: 10.6 (ref 7–25)
CALCIUM SPEC-SCNC: 9.3 MG/DL (ref 8.6–10.5)
CHLORIDE SERPL-SCNC: 101 MMOL/L (ref 98–107)
CO2 SERPL-SCNC: 21.2 MMOL/L (ref 22–29)
CREAT SERPL-MCNC: 0.66 MG/DL (ref 0.57–1)
CREAT UR-MCNC: 77.5 MG/DL
DEPRECATED RDW RBC AUTO: 42 FL (ref 37–54)
EGFRCR SERPLBLD CKD-EPI 2021: 125.8 ML/MIN/1.73
EOSINOPHIL # BLD AUTO: 0.11 10*3/MM3 (ref 0–0.4)
EOSINOPHIL NFR BLD AUTO: 0.7 % (ref 0.3–6.2)
ERYTHROCYTE [DISTWIDTH] IN BLOOD BY AUTOMATED COUNT: 13.6 % (ref 12.3–15.4)
GLOBULIN UR ELPH-MCNC: 3.2 GM/DL
GLUCOSE SERPL-MCNC: 85 MG/DL (ref 65–99)
HCT VFR BLD AUTO: 34.4 % (ref 34–46.6)
HGB BLD-MCNC: 11 G/DL (ref 12–15.9)
IMM GRANULOCYTES # BLD AUTO: 0.38 10*3/MM3 (ref 0–0.05)
IMM GRANULOCYTES NFR BLD AUTO: 2.4 % (ref 0–0.5)
LYMPHOCYTES # BLD AUTO: 1.8 10*3/MM3 (ref 0.7–3.1)
LYMPHOCYTES NFR BLD AUTO: 11.4 % (ref 19.6–45.3)
MCH RBC QN AUTO: 27.2 PG (ref 26.6–33)
MCHC RBC AUTO-ENTMCNC: 32 G/DL (ref 31.5–35.7)
MCV RBC AUTO: 84.9 FL (ref 79–97)
MONOCYTES # BLD AUTO: 0.96 10*3/MM3 (ref 0.1–0.9)
MONOCYTES NFR BLD AUTO: 6.1 % (ref 5–12)
NEUTROPHILS NFR BLD AUTO: 12.42 10*3/MM3 (ref 1.7–7)
NEUTROPHILS NFR BLD AUTO: 78.9 % (ref 42.7–76)
NRBC BLD AUTO-RTO: 0 /100 WBC (ref 0–0.2)
PLATELET # BLD AUTO: 254 10*3/MM3 (ref 140–450)
PMV BLD AUTO: 10 FL (ref 6–12)
POTASSIUM SERPL-SCNC: 3.4 MMOL/L (ref 3.5–5.2)
PROT ?TM UR-MCNC: 19.1 MG/DL
PROT SERPL-MCNC: 7 G/DL (ref 6–8.5)
PROT/CREAT UR: 0.25 MG/G{CREAT}
RBC # BLD AUTO: 4.05 10*6/MM3 (ref 3.77–5.28)
SODIUM SERPL-SCNC: 137 MMOL/L (ref 136–145)
WBC NRBC COR # BLD AUTO: 15.75 10*3/MM3 (ref 3.4–10.8)

## 2024-03-26 PROCEDURE — G0463 HOSPITAL OUTPT CLINIC VISIT: HCPCS

## 2024-03-26 PROCEDURE — 59025 FETAL NON-STRESS TEST: CPT

## 2024-03-26 PROCEDURE — 82570 ASSAY OF URINE CREATININE: CPT | Performed by: OBSTETRICS & GYNECOLOGY

## 2024-03-26 PROCEDURE — 85025 COMPLETE CBC W/AUTO DIFF WBC: CPT | Performed by: OBSTETRICS & GYNECOLOGY

## 2024-03-26 PROCEDURE — 84156 ASSAY OF PROTEIN URINE: CPT | Performed by: OBSTETRICS & GYNECOLOGY

## 2024-03-26 PROCEDURE — 25810000003 LACTATED RINGERS SOLUTION: Performed by: OBSTETRICS & GYNECOLOGY

## 2024-03-26 PROCEDURE — 80053 COMPREHEN METABOLIC PANEL: CPT | Performed by: OBSTETRICS & GYNECOLOGY

## 2024-03-26 RX ADMIN — SODIUM CHLORIDE, POTASSIUM CHLORIDE, SODIUM LACTATE AND CALCIUM CHLORIDE 1000 ML: 600; 310; 30; 20 INJECTION, SOLUTION INTRAVENOUS at 16:55

## 2024-03-26 NOTE — H&P
[facility]  Obstetric History and Physical    Chief Complaint   Patient presents with    Elevated Blood Pressure    Dizziness       Subjective     HPI:    Patient is a 24 y.o. female  currently at 34w4d. 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 Elisha leaned over to assist the coworker and when she stood up she felt dizzy and had blurred vision. Another nurse assisted Elisha to a seated position and took vitals. Her BP was elevated to 147/110. Patient was noted to be flushed. She was brought to L&D for evaluation. Currently she feels better, still flushed. She denies abdominal pain or contractions. She has no other concerns. Still feels a little dizzy.     She has been seeing [facility] [place] care.  The pregnancy has been complicated by the following problems:    Patient Active Problem List   Diagnosis    Family history of diabetes mellitus    Family history of stroke    Allergic rhinitis    Supervision of other normal pregnancy, antepartum    Rubella nonimmune       The following portions of the patients history were reviewed and updated as appropriate:   current medications, allergies, past medical history, past surgical history, past family history, past social history and current problem list.     Prenatal Information:  Prenatal Results       Initial Prenatal Labs       Test Value Reference Range Date Time    Hemoglobin  13.6 g/dL 12.0 - 15.9 10/30/23 1543    Hematocrit  38.6 % 34.0 - 46.6 10/30/23 1543    Platelets  300 10*3/mm3 140 - 450 10/30/23 1543    Rubella IgG  <0.90 index Immune >0.99 10/30/23 1543    Hepatitis B SAg  Non-Reactive  Non-Reactive 10/30/23 1543    Hepatitis C Ab  Non-Reactive  Non-Reactive 10/30/23 1543    RPR        T. Pallidum Ab   Non-Reactive  Non-Reactive 10/30/23 1543    ABO  A   10/30/23 1543    Rh  Positive   10/30/23 1543    Antibody Screen  Negative   10/30/23 1543    HIV  Non-Reactive  Non-Reactive 10/30/23 1543    Urine Culture  No  growth   10/30/23 1517    Gonorrhea  Not Detected  Not Detected  10/30/23 1517    Chlamydia  Not Detected  Not Detected  10/30/23 1517    TSH        HgB A1c   4.60 % 4.80 - 5.60 10/30/23 1543                2nd and 3rd Trimester       Test Value Reference Range Date Time    Hemoglobin (repeated)  12.5 g/dL 12.0 - 15.9 24 1428    Hematocrit (repeated)  36.8 % 34.0 - 46.6 24 1428    Platelets   311 10*3/mm3 140 - 450 24 1428       300 10*3/mm3 140 - 450 10/30/23 1543    GCT  103 mg/dL 65 - 139 24 1428       89 mg/dL 65 - 139 23 1227                Drug Screening       Test Value Reference Range Date Time    Amphetamine Screen        Barbiturate Screen  Negative  Negative 10/30/23 1517    Benzodiazepine Screen  Negative  Negative 10/30/23 1517    Methadone Screen  Negative  Negative 10/30/23 1517    Phencyclidine Screen        Opiates Screen  Negative  Negative 10/30/23 1517    THC Screen  Negative  Negative 10/30/23 1517    Cocaine Screen  Negative  Negative 10/30/23 1517    Propoxyphene Screen        Buprenorphine Screen        Methamphetamine Screen        Oxycodone Screen  Negative  Negative 10/30/23 151    Tricyclic Antidepressants Screen                      Past OB History:     OB History    Para Term  AB Living   1 0 0 0 0 0   SAB IAB Ectopic Molar Multiple Live Births   0 0 0 0 0 0      # Outcome Date GA Lbr [place]/2nd Weight Sex Type Anes PTL Lv   1 Current                Past Medical History: Past Medical History:   Diagnosis Date    Seasonal allergies       Past Surgical History History reviewed. No pertinent surgical history.   Family History: Family History   Problem Relation Age of Onset    Hyperlipidemia Father     Diabetes Mother         DMT2    No Known Problems Brother     No Known Problems Sister     No Known Problems Son     No Known Problems Daughter     No Known Problems Paternal Grandfather     No Known Problems Paternal Grandmother     Hyperlipidemia  Maternal Grandmother     Hyperlipidemia Maternal Grandfather     Diabetes Paternal Uncle         DMT2    Diabetes Paternal Uncle         DMT1    No Known Problems Cousin     No Known Problems Other     Rheum arthritis Neg Hx     Osteoarthritis Neg Hx     Asthma Neg Hx     Heart failure Neg Hx     Hypertension Neg Hx     Migraines Neg Hx     Rashes / Skin problems Neg Hx     Seizures Neg Hx     Stroke Neg Hx     Thyroid disease Neg Hx     Breast cancer Neg Hx     Uterine cancer Neg Hx     Ovarian cancer Neg Hx     Colon cancer Neg Hx       Social History:  reports that she has never smoked. She has never been exposed to tobacco smoke. She has never used smokeless tobacco.   reports that she does not currently use alcohol.   reports no history of drug use.        General ROS: Pertinent items are noted in HPI  Home Medications:  Prenatal Vit-Fe Fumarate-FA and loratadine    Allergies:  Allergies   Allergen Reactions    Penicillins Unknown - High Severity     Pt reports father is allergic to pencillin       Objective       Vital Signs Range for the last 24 hours  Temperature:     Temp Source:     BP: BP: (103-136)/(64-83) 110/66   Pulse: Heart Rate:  [] 95   Respirations:     SPO2: SpO2:  [98 %-100 %] 98 %     Vitals:    03/26/24 1700 03/26/24 1710 03/26/24 1720 03/26/24 1730   BP: 111/83 103/64 107/69 110/66   Pulse: 108 104 102 95   SpO2: 98% 99% 98% 98%       Physical Examination:   General appearance - alert, well appearing, and in no distress  Mental status - alert, oriented to person, place, and time  Chest - normal effort; CTA  Heart - tachycardic, regular rhythm  Abdomen - soft, nondistended; no rebound or guarding  Pelvic - deferred  Back exam - no CVA tenderness  Neurological - alert, oriented, normal speech  Extremities - Edema Trace; DTR 1-2+  Skin - normal coloration and turgor, no suspicious skin lesions noted     [place]: uterine irritability   NST: 120-130 with accels; no decels  -  Lab Results (last  24 hours)       Procedure Component Value Units Date/Time    CBC & Differential [[id number]]  (Abnormal) Collected: 03/26/24 1656    Specimen: Blood Updated: 03/26/24 1709    Narrative:      The following orders were created for panel order CBC & Differential.  Procedure                               Abnormality         Status                     ---------                               -----------         ------                     CBC Auto Differential[[id number]]        Abnormal            Final result                 Please view results for these tests on the individual orders.    Comprehensive Metabolic Panel [[id number]]  (Abnormal) Collected: 03/26/24 1656    Specimen: Blood Updated: 03/26/24 1729     Glucose 85 mg/dL      BUN 7 mg/dL      Creatinine 0.66 mg/dL      Sodium 137 mmol/L      Potassium 3.4 mmol/L      Chloride 101 mmol/L      CO2 21.2 mmol/L      Calcium 9.3 mg/dL      Total Protein 7.0 g/dL      Albumin 3.8 g/dL      ALT (SGPT) 20 U/L      AST (SGOT) 19 U/L      Alkaline Phosphatase 110 U/L      Total Bilirubin <0.2 mg/dL      Globulin 3.2 gm/dL      A/G Ratio 1.2 g/dL      BUN/Creatinine Ratio 10.6     Anion Gap 14.8 mmol/L      eGFR 125.8 mL/min/1.73     Narrative:      GFR Normal >60  Chronic Kidney Disease <60  Kidney Failure <15      Protein / Creatinine Ratio, Urine - Urine, Clean Catch [[id number]] Collected: 03/26/24 1656    Specimen: Urine, Clean Catch Updated: 03/26/24 1729     Creatinine, Urine 77.5 mg/dL      Total Protein, Urine 19.1 mg/dL      Protein/Creatinine Ratio, Urine 0.25    CBC Auto Differential [[id number]]  (Abnormal) Collected: 03/26/24 1656    Specimen: Blood Updated: 03/26/24 1709     WBC 15.75 10*3/mm3      RBC 4.05 10*6/mm3      Hemoglobin 11.0 g/dL      Hematocrit 34.4 %      MCV 84.9 fL      MCH 27.2 pg      MCHC 32.0 g/dL      RDW 13.6 %      RDW-SD 42.0 fl      MPV 10.0 fL      Platelets 254 10*3/mm3      Neutrophil % 78.9 %      Lymphocyte % 11.4 %      Monocyte %  6.1 %      Eosinophil % 0.7 %      Basophil % 0.5 %      Immature Grans % 2.4 %      Neutrophils, Absolute 12.42 10*3/mm3      Lymphocytes, Absolute 1.80 10*3/mm3      Monocytes, Absolute 0.96 10*3/mm3      Eosinophils, Absolute 0.11 10*3/mm3      Basophils, Absolute 0.08 10*3/mm3      Immature Grans, Absolute 0.38 10*3/mm3      nRBC 0.0 /100 WBC             Assessment & Plan     Assessment:  -  Intrauterine pregnancy at 34w4d gestation who presents for: episode of dizziness, blurred vision and elevated BP    Plan:  - BP normal here. FHR reassuring. P/C 0.25. will send home with supplies for 24 hour urine collection. Patient to return for headache, vision changes, RUQ pain. Has follow up appt at primary ob in 2 days.  - Plan of care has been reviewed with patient and patient agrees.   - Risks, benefits of treatment plan have been discussed.  - All questions have been answered.        Electronically signed by Queenie Toro MD, 03/26/24, 4:48 PM EDT.

## 2024-03-26 NOTE — NON STRESS TEST
Obstetrical Non-stress Test Interpretation     Name:  Elisha Patten  MRN: [id number]    24 y.o. female  at 34w4d    Indication: elevated bp      Fetal Assessment  Fetal Movement: active  Fetal HR Assessment Method: external  Fetal HR (beats/min): 135  Fetal HR Baseline: normal range  Fetal HR Variability: moderate (amplitude range 6 to 25 bpm)  Fetal HR Accelerations: greater than/equal to 15 bpm, lasting at least 15 seconds  Fetal HR Decelerations: absent    /66   Pulse 95   LMP 2023 (Exact Date)   SpO2 98%     Reason for test: OB Triage, Hypertension  Date of Test: 3/26/2024  Time frame of test: 1321-5328  RN NST Interpretation: Reactive      Isabell Chen  3/26/2024  18:08 EDT

## 2024-03-28 ENCOUNTER — TRANSCRIBE ORDERS (OUTPATIENT)
Dept: LAB | Facility: HOSPITAL | Age: 24
End: 2024-03-28
Payer: COMMERCIAL

## 2024-03-28 ENCOUNTER — ROUTINE PRENATAL (OUTPATIENT)
Dept: OBSTETRICS AND GYNECOLOGY | Facility: CLINIC | Age: 24
End: 2024-03-28
Payer: COMMERCIAL

## 2024-03-28 VITALS — WEIGHT: 198 LBS | BODY MASS INDEX: 35.07 KG/M2 | DIASTOLIC BLOOD PRESSURE: 80 MMHG | SYSTOLIC BLOOD PRESSURE: 123 MMHG

## 2024-03-28 DIAGNOSIS — Z34.80 SUPERVISION OF OTHER NORMAL PREGNANCY, ANTEPARTUM: Primary | ICD-10-CM

## 2024-03-28 LAB
COLLECT DURATION TIME UR: 24 HRS
GLUCOSE UR STRIP-MCNC: NEGATIVE MG/DL
PROT 24H UR-MRATE: 178.8 MG/24HOURS (ref 0–150)
PROT UR STRIP-MCNC: NEGATIVE MG/DL
SPECIMEN VOL 24H UR: 3250 ML

## 2024-03-28 PROCEDURE — 84156 ASSAY OF PROTEIN URINE: CPT | Performed by: STUDENT IN AN ORGANIZED HEALTH CARE EDUCATION/TRAINING PROGRAM

## 2024-03-28 PROCEDURE — 81050 URINALYSIS VOLUME MEASURE: CPT | Performed by: STUDENT IN AN ORGANIZED HEALTH CARE EDUCATION/TRAINING PROGRAM

## 2024-03-28 NOTE — PROGRESS NOTES
OB FOLLOW UP  Complaint   Chief Complaint   Patient presents with    Routine Prenatal Visit            Elisha Patten is a 24 y.o.  34w6d patient being seen today for her obstetrical follow up visit. Patient denies decreased fetal movement, contractions, loss of fluid or vaginal bleeding.  Had  episode of dizziness approximately week and a half ago. Resolved spontaneously. Was evaluated for elevated BP earlier this week. 24 TUP was turned in today. Patient denies any headache, visual disturbances, new onset nausea vomiting, right upper quadrant pain, or new onset swelling.      Her prenatal care is complicated by (and status) :    Patient Active Problem List   Diagnosis    Family history of diabetes mellitus    Family history of stroke    Allergic rhinitis    Supervision of other normal pregnancy, antepartum    Rubella nonimmune       All other systems reviewed and are negative.     The additional following portions of the patient's history were reviewed and updated as appropriate: allergies, current medications, past family history, past medical history, past social history, past surgical history, and problem list.      EXAM:     Vital signs: /80   Wt 89.8 kg (198 lb)   LMP 2023 (Exact Date)   BMI 35.07 kg/m²   Appearance/psychiatric: To be in no distress  Constitutional: The patient is well nourished.  Cardiovascular: She does not have edema.  Respiratory: Respiratory effort is normal.  Gastrointestinal: Abdomen is soft, gravid, nontender, no rashes, heart tones are present, fundal height is size equals dates    Pelvic Exam: No    Urine glucose/protein: See prenatal flowsheet       Assessment and Plan    Problem List Items Addressed This Visit          Gravid and     Supervision of other normal pregnancy, antepartum - Primary    Overview     MACEY finalize:Estimated Date of Delivery: 5/3/24 based on LMP, confirmed with BSUS at new OB      Genetic testing (NIPS-Quad)/CF/AFP:  cfDNA_neg,  XX, CF/SMA-neg    COVID: received, needs booster  Flu: received   Tdap:script 27-36 weeks  RSV: 32-36 weeks    Rhogam: A positive     Sterilization:none     Anatomy US:growth 29%, AC 35%, consistent with dating, all anatomy seen today and WNL, mild renal pelvis dilation of rigjt side at 4mm, normal is less than 4mm, anterior placenta-grade 1, 3VC with normal insertion, FHTs 145, vertex,female   FU US:No renal dilation noted, Growth 23%, AC 53%, GENEVIEVE 17cm, Anterior placenta-grade 1, breech, FHTS 136  EPDS: 6, 0 on #10    PROBLEM LIST/PLAN:   Nulliparity   Family hx Type 2 DM-mother, early GTT normal   Rubella nonimmune-MMR PP         Relevant Orders    POC Urinalysis Dipstick (Completed)    Protein, Urine, 24 Hour - Urine, Clean Catch       Impression  Pregnancy at 34w6d  Fetal status reassuring.   Activity and Exercise discussed.    Plan  Discussion regarding warning signs for preeclampsia. Recommendation for BP monitoring  RTO in 1 week for BP       Patient was counseled to the following pregnancy precautions:  Decreased fetal movement, if concern for decreased fetal movement please perform fetal kick counts you are looking for 10 movements in 2 hours.  If concern for fetal movement and not meeting that criteria, please present to triage for evaluation.  Contractions occurring every 5 minutes for over an hour, lasting 30 to 60 seconds and progressively causing more discomfort, please seek medical attention to rule out labor  If you believe that your water is broken, place a sanitary pad.  If pad fills in short period of time i.e. less than 5 minutes, take off pad placed another pad.  If this is saturated please present for rule out rupture of membranes  Vaginal bleeding can be normal in pregnancy, this usually takes a form of spotting.  If having heavier bleeding like a menstrual period please present for evaluation; especially in light of severe abdominal pain this could represent a placental abruption.  Keep all  scheduled appointments as recommended.        Arsenio Saunders MD  03/28/2024

## 2024-04-05 ENCOUNTER — ROUTINE PRENATAL (OUTPATIENT)
Dept: OBSTETRICS AND GYNECOLOGY | Facility: CLINIC | Age: 24
End: 2024-04-05
Payer: COMMERCIAL

## 2024-04-05 VITALS — BODY MASS INDEX: 35.07 KG/M2 | SYSTOLIC BLOOD PRESSURE: 125 MMHG | WEIGHT: 198 LBS | DIASTOLIC BLOOD PRESSURE: 80 MMHG

## 2024-04-05 DIAGNOSIS — Z34.80 SUPERVISION OF OTHER NORMAL PREGNANCY, ANTEPARTUM: Primary | ICD-10-CM

## 2024-04-05 LAB
GLUCOSE UR STRIP-MCNC: NEGATIVE MG/DL
PROT UR STRIP-MCNC: NEGATIVE MG/DL

## 2024-04-05 PROCEDURE — 87653 STREP B DNA AMP PROBE: CPT | Performed by: STUDENT IN AN ORGANIZED HEALTH CARE EDUCATION/TRAINING PROGRAM

## 2024-04-05 NOTE — PROGRESS NOTES
OB FOLLOW UP  Complaint   Chief Complaint   Patient presents with    Routine Prenatal Visit            Elisha Patten is a 24 y.o.  36w0d patient being seen today for her obstetrical follow up visit. Patient denies decreased fetal movement, contractions, loss of fluid or vaginal bleeding.  No acute complaints.    Her prenatal care is complicated by (and status) :    Patient Active Problem List   Diagnosis    Family history of diabetes mellitus    Family history of stroke    Allergic rhinitis    Supervision of other normal pregnancy, antepartum    Rubella nonimmune       All other systems reviewed and are negative.     The additional following portions of the patient's history were reviewed and updated as appropriate: allergies, current medications, past family history, past medical history, past social history, past surgical history, and problem list.      EXAM:     Vital signs: /80   Wt 89.8 kg (198 lb)   LMP 2023 (Exact Date)   BMI 35.07 kg/m²   Appearance/psychiatric: To be in no distress  Constitutional: The patient is well nourished.  Cardiovascular: She does not have edema.  Respiratory: Respiratory effort is normal.  Gastrointestinal: Abdomen is soft, gravid, nontender, no rashes, heart tones are present, fundal height is size equals dates    Pelvic Exam: Yes.  Presentation: cephalic. Dilation: 2-3. Effacement: 60. Station: -3.    Urine glucose/protein: See prenatal flowsheet       Assessment and Plan    Problem List Items Addressed This Visit          Gravid and     Supervision of other normal pregnancy, antepartum - Primary    Overview     MACEY finalize:Estimated Date of Delivery: 5/3/24 based on LMP, confirmed with BSUS at new OB      Genetic testing (NIPS-Quad)/CF/AFP:  cfDNA_neg, XX, CF/SMA-neg    COVID: received, needs booster  Flu: received   Tdap:script 27-36 weeks  RSV: 32-36 weeks    Rhogam: A positive     Sterilization:none     Anatomy US:growth 29%, AC 35%, consistent  with dating, all anatomy seen today and WNL, mild renal pelvis dilation of rigjt side at 4mm, normal is less than 4mm, anterior placenta-grade 1, 3VC with normal insertion, FHTs 145, vertex,female   FU US:No renal dilation noted, Growth 23%, AC 53%, GENEVIEVE 17cm, Anterior placenta-grade 1, breech, FHTS 136  EPDS: 6, 0 on #10    PROBLEM LIST/PLAN:   Nulliparity   Family hx Type 2 DM-mother, early GTT normal   Rubella nonimmune-MMR PP         Relevant Orders    POC Urinalysis Dipstick (Completed)       Impression  Pregnancy at 36w0d  Fetal status reassuring.   Activity and Exercise discussed.    Plan  GBS swab collected today  Discussion with patient regards to elective induction of labor versus postdates induction.  Patient would like to ensue spontaneous labor.  No indication for medical delivery at this point in time.  Return to office in 1 week      Patient was counseled to the following pregnancy precautions:  Decreased fetal movement, if concern for decreased fetal movement please perform fetal kick counts you are looking for 10 movements in 2 hours.  If concern for fetal movement and not meeting that criteria, please present to triage for evaluation.  Contractions occurring every 5 minutes for over an hour, lasting 30 to 60 seconds and progressively causing more discomfort, please seek medical attention to rule out labor  If you believe that your water is broken, place a sanitary pad.  If pad fills in short period of time i.e. less than 5 minutes, take off pad placed another pad.  If this is saturated please present for rule out rupture of membranes  Vaginal bleeding can be normal in pregnancy, this usually takes a form of spotting.  If having heavier bleeding like a menstrual period please present for evaluation; especially in light of severe abdominal pain this could represent a placental abruption.  Keep all scheduled appointments as recommended.        Arsenio Saunders MD  04/05/2024

## 2024-04-06 LAB — GROUP B STREP, DNA: POSITIVE

## 2024-04-08 ENCOUNTER — TELEPHONE (OUTPATIENT)
Dept: OBSTETRICS AND GYNECOLOGY | Facility: CLINIC | Age: 24
End: 2024-04-08
Payer: COMMERCIAL

## 2024-04-08 NOTE — TELEPHONE ENCOUNTER
----- Message from Arsenio Saunders MD sent at 4/8/2024  8:47 AM EDT -----  GBS positive. Antibiotics during labor recommended.

## 2024-04-10 ENCOUNTER — ROUTINE PRENATAL (OUTPATIENT)
Dept: OBSTETRICS AND GYNECOLOGY | Facility: CLINIC | Age: 24
End: 2024-04-10
Payer: COMMERCIAL

## 2024-04-10 VITALS — WEIGHT: 199.2 LBS | BODY MASS INDEX: 35.29 KG/M2 | DIASTOLIC BLOOD PRESSURE: 86 MMHG | SYSTOLIC BLOOD PRESSURE: 116 MMHG

## 2024-04-10 DIAGNOSIS — B06.9: ICD-10-CM

## 2024-04-10 DIAGNOSIS — Z34.80 SUPERVISION OF OTHER NORMAL PREGNANCY, ANTEPARTUM: Primary | ICD-10-CM

## 2024-04-10 DIAGNOSIS — O98.519: ICD-10-CM

## 2024-04-10 LAB
BACTERIA SPEC AEROBE CULT: ABNORMAL
GLUCOSE UR STRIP-MCNC: NEGATIVE MG/DL
PROT UR STRIP-MCNC: NEGATIVE MG/DL

## 2024-04-10 PROCEDURE — 0502F SUBSEQUENT PRENATAL CARE: CPT | Performed by: OBSTETRICS & GYNECOLOGY

## 2024-04-10 NOTE — PROGRESS NOTES
Routine Prenatal Visit     Subjective  Elisha Patten is a 24 y.o.  at 36w5d here for her routine OB visit.   She is taking her prenatal vitamins.Reports no loss of fluid or vaginal bleeding. Patient doing well without any complaints. Pregnancy complicated by:     Patient Active Problem List   Diagnosis    Family history of diabetes mellitus    Family history of stroke    Allergic rhinitis    Supervision of other normal pregnancy, antepartum    Rubella nonimmune         OB History    Para Term  AB Living   1             SAB IAB Ectopic Molar Multiple Live Births                    # Outcome Date GA Lbr [place]/2nd Weight Sex Type Anes PTL Lv   1 Current                    ROS:   General ROS: negative for - chills or fatigue  Respiratory ROS: negative for - cough or hemoptysis  Cardiovascular ROS: negative for - chest pain or dyspnea on exertion  Genito-Urinary ROS: negative for  change in urinary stream, vaginal discharge   Musculoskeletal ROS: negative for - gait disturbance or joint pain  Dermatological ROS: negative for acne,  dry skin or itching    Objective  Physical Exam:   Vitals:    04/10/24 0834   BP: 116/86       Uterine Size: size equals dates  FHT: 110-160 BPM    General appearance - alert, well appearing, and in no distress  Mental status - alert, oriented to person, place, and time  Abdomen- Soft, Gravid uterus, non-tender to palpation  Musculoskeletal: negative for - gait disturbance or joint pain  Extremeties: negative swelling or cyanosis   Dermatological: negative rashes or skin lesions       Assessment/Plan:   Diagnoses and all orders for this visit:    1. Supervision of other normal pregnancy, antepartum (Primary)  Assessment & Plan:  PROBLEM LIST/PLAN:   Nulliparity   Family hx Type 2 DM-mother, early GTT normal   Rubella nonimmune-MMR PP    Orders:  -     POC Urinalysis Dipstick    2. Rubella nonimmune            Counseling:   OB precautions, leaking, VB, meche ardon vs  PTL/Labor  FKC  HTN precautions reviewed: HA, vision change, RUQ/epigastric pain, edema  Round Ligament Pain:  The uterus has several ligaments which provide support and keep the uterus in place. As the  uterus grows these ligaments are pulled and stretched which often causes sharp stabbing like pain in the inguinal area.   You may find a pregnancy support band helpful. Changing positions may also help. Yoga is a great way to cope with round ligament and low back pain in pregnancy.    Massage may also help with low back pain   Things to Consider at this Point in your Pregnancy:  Some women experience swelling in their feet during pregnancy. Compression stockings may help  Drink plenty of water and stay active   Make sure you are eating frequent small meals, nuts are a wonderful snack to keep with you            Return in about 1 week (around 4/17/2024) for Routine OB visit.      We have gone over prenatal care to include the timing and content of visits. I informed her how to contact the office and/or on call person in the event of any problems and encouraged her to do so when she feels it is necessary.  We then spent time answering her questions which she indicated were answered to her satisfaction.    Audrey Hi DO  4/10/2024 08:41 EDT

## 2024-04-15 ENCOUNTER — ANESTHESIA EVENT (OUTPATIENT)
Dept: LABOR AND DELIVERY | Facility: HOSPITAL | Age: 24
End: 2024-04-15
Payer: COMMERCIAL

## 2024-04-15 ENCOUNTER — ANESTHESIA (OUTPATIENT)
Dept: LABOR AND DELIVERY | Facility: HOSPITAL | Age: 24
End: 2024-04-15
Payer: COMMERCIAL

## 2024-04-15 ENCOUNTER — HOSPITAL ENCOUNTER (INPATIENT)
Facility: HOSPITAL | Age: 24
LOS: 2 days | Discharge: HOME | End: 2024-04-17
Attending: OBSTETRICS & GYNECOLOGY | Admitting: OBSTETRICS & GYNECOLOGY
Payer: COMMERCIAL

## 2024-04-15 PROBLEM — O42.00 PROM WITH ONSET OF LABOR WITHIN 24 HOURS OF RUPTURE: Status: ACTIVE | Noted: 2024-04-15

## 2024-04-15 LAB
ABO GROUP BLD: NORMAL
ALBUMIN SERPL-MCNC: 3.7 G/DL (ref 3.5–5.2)
ALBUMIN/GLOB SERPL: 1.2 G/DL
ALP SERPL-CCNC: 135 U/L (ref 39–117)
ALT SERPL W P-5'-P-CCNC: 12 U/L (ref 1–33)
AMPHET+METHAMPHET UR QL: NEGATIVE
ANION GAP SERPL CALCULATED.3IONS-SCNC: 13.9 MMOL/L (ref 5–15)
AST SERPL-CCNC: 12 U/L (ref 1–32)
BARBITURATES UR QL SCN: NEGATIVE
BENZODIAZ UR QL SCN: NEGATIVE
BILIRUB SERPL-MCNC: <0.2 MG/DL (ref 0–1.2)
BLD GP AB SCN SERPL QL: NEGATIVE
BUN SERPL-MCNC: 9 MG/DL (ref 6–20)
BUN/CREAT SERPL: 15.8 (ref 7–25)
CALCIUM SPEC-SCNC: 9 MG/DL (ref 8.6–10.5)
CANNABINOIDS SERPL QL: NEGATIVE
CHLORIDE SERPL-SCNC: 100 MMOL/L (ref 98–107)
CO2 SERPL-SCNC: 22.1 MMOL/L (ref 22–29)
COCAINE UR QL: NEGATIVE
CREAT SERPL-MCNC: 0.57 MG/DL (ref 0.57–1)
CREAT UR-MCNC: 29.2 MG/DL
DEPRECATED RDW RBC AUTO: 45.7 FL (ref 37–54)
EGFRCR SERPLBLD CKD-EPI 2021: 130.3 ML/MIN/1.73
ERYTHROCYTE [DISTWIDTH] IN BLOOD BY AUTOMATED COUNT: 15 % (ref 12.3–15.4)
FENTANYL UR-MCNC: NEGATIVE NG/ML
GLOBULIN UR ELPH-MCNC: 3.1 GM/DL
GLUCOSE SERPL-MCNC: 91 MG/DL (ref 65–99)
HCT VFR BLD AUTO: 34.5 % (ref 34–46.6)
HGB BLD-MCNC: 11.4 G/DL (ref 12–15.9)
MCH RBC QN AUTO: 27.9 PG (ref 26.6–33)
MCHC RBC AUTO-ENTMCNC: 33 G/DL (ref 31.5–35.7)
MCV RBC AUTO: 84.4 FL (ref 79–97)
METHADONE UR QL SCN: NEGATIVE
OPIATES UR QL: NEGATIVE
OXYCODONE UR QL SCN: NEGATIVE
PLATELET # BLD AUTO: 230 10*3/MM3 (ref 140–450)
PMV BLD AUTO: 10.2 FL (ref 6–12)
POTASSIUM SERPL-SCNC: 3.8 MMOL/L (ref 3.5–5.2)
PROT ?TM UR-MCNC: 5.4 MG/DL
PROT SERPL-MCNC: 6.8 G/DL (ref 6–8.5)
PROT/CREAT UR: 0.18 MG/G{CREAT}
RBC # BLD AUTO: 4.09 10*6/MM3 (ref 3.77–5.28)
RH BLD: POSITIVE
SODIUM SERPL-SCNC: 136 MMOL/L (ref 136–145)
T PALLIDUM IGG SER QL: NORMAL
T&S EXPIRATION DATE: NORMAL
WBC NRBC COR # BLD AUTO: 17.17 10*3/MM3 (ref 3.4–10.8)

## 2024-04-15 PROCEDURE — 51702 INSERT TEMP BLADDER CATH: CPT

## 2024-04-15 PROCEDURE — 85027 COMPLETE CBC AUTOMATED: CPT | Performed by: OBSTETRICS & GYNECOLOGY

## 2024-04-15 PROCEDURE — 86901 BLOOD TYPING SEROLOGIC RH(D): CPT | Performed by: OBSTETRICS & GYNECOLOGY

## 2024-04-15 PROCEDURE — 59400 OBSTETRICAL CARE: CPT | Performed by: STUDENT IN AN ORGANIZED HEALTH CARE EDUCATION/TRAINING PROGRAM

## 2024-04-15 PROCEDURE — 88307 TISSUE EXAM BY PATHOLOGIST: CPT | Performed by: STUDENT IN AN ORGANIZED HEALTH CARE EDUCATION/TRAINING PROGRAM

## 2024-04-15 PROCEDURE — 59025 FETAL NON-STRESS TEST: CPT

## 2024-04-15 PROCEDURE — 25810000003 SODIUM CHLORIDE 0.9 % SOLUTION: Performed by: OBSTETRICS & GYNECOLOGY

## 2024-04-15 PROCEDURE — 80307 DRUG TEST PRSMV CHEM ANLYZR: CPT | Performed by: OBSTETRICS & GYNECOLOGY

## 2024-04-15 PROCEDURE — C1755 CATHETER, INTRASPINAL: HCPCS | Performed by: REGISTERED NURSE

## 2024-04-15 PROCEDURE — 84156 ASSAY OF PROTEIN URINE: CPT | Performed by: OBSTETRICS & GYNECOLOGY

## 2024-04-15 PROCEDURE — 25810000003 SODIUM CHLORIDE 0.9 % SOLUTION: Performed by: STUDENT IN AN ORGANIZED HEALTH CARE EDUCATION/TRAINING PROGRAM

## 2024-04-15 PROCEDURE — 25010000002 FENTANYL CITRATE (PF) 50 MCG/ML SOLUTION: Performed by: REGISTERED NURSE

## 2024-04-15 PROCEDURE — 80053 COMPREHEN METABOLIC PANEL: CPT | Performed by: OBSTETRICS & GYNECOLOGY

## 2024-04-15 PROCEDURE — 25010000002 OXYTOCIN PER 10 UNITS: Performed by: OBSTETRICS & GYNECOLOGY

## 2024-04-15 PROCEDURE — 86900 BLOOD TYPING SEROLOGIC ABO: CPT | Performed by: OBSTETRICS & GYNECOLOGY

## 2024-04-15 PROCEDURE — 0UQGXZZ REPAIR VAGINA, EXTERNAL APPROACH: ICD-10-PCS | Performed by: STUDENT IN AN ORGANIZED HEALTH CARE EDUCATION/TRAINING PROGRAM

## 2024-04-15 PROCEDURE — 86780 TREPONEMA PALLIDUM: CPT | Performed by: OBSTETRICS & GYNECOLOGY

## 2024-04-15 PROCEDURE — 25810000003 LACTATED RINGERS SOLUTION: Performed by: OBSTETRICS & GYNECOLOGY

## 2024-04-15 PROCEDURE — 86850 RBC ANTIBODY SCREEN: CPT | Performed by: OBSTETRICS & GYNECOLOGY

## 2024-04-15 PROCEDURE — 82570 ASSAY OF URINE CREATININE: CPT | Performed by: OBSTETRICS & GYNECOLOGY

## 2024-04-15 PROCEDURE — 25010000002 OXYTOCIN PER 10 UNITS: Performed by: STUDENT IN AN ORGANIZED HEALTH CARE EDUCATION/TRAINING PROGRAM

## 2024-04-15 RX ORDER — PROMETHAZINE HYDROCHLORIDE 25 MG/1
25 TABLET ORAL EVERY 6 HOURS PRN
Status: DISCONTINUED | OUTPATIENT
Start: 2024-04-15 | End: 2024-04-15 | Stop reason: HOSPADM

## 2024-04-15 RX ORDER — FAMOTIDINE 10 MG/ML
20 INJECTION, SOLUTION INTRAVENOUS 2 TIMES DAILY PRN
Status: DISCONTINUED | OUTPATIENT
Start: 2024-04-15 | End: 2024-04-15 | Stop reason: HOSPADM

## 2024-04-15 RX ORDER — OXYCODONE HYDROCHLORIDE 5 MG/1
5 TABLET ORAL EVERY 4 HOURS PRN
Status: DISCONTINUED | OUTPATIENT
Start: 2024-04-15 | End: 2024-04-17 | Stop reason: HOSPADM

## 2024-04-15 RX ORDER — PROMETHAZINE HYDROCHLORIDE 25 MG/1
12.5 TABLET ORAL EVERY 4 HOURS PRN
Status: DISCONTINUED | OUTPATIENT
Start: 2024-04-15 | End: 2024-04-17 | Stop reason: HOSPADM

## 2024-04-15 RX ORDER — ONDANSETRON 2 MG/ML
4 INJECTION INTRAMUSCULAR; INTRAVENOUS EVERY 6 HOURS PRN
Status: DISCONTINUED | OUTPATIENT
Start: 2024-04-15 | End: 2024-04-15 | Stop reason: HOSPADM

## 2024-04-15 RX ORDER — IBUPROFEN 600 MG/1
600 TABLET ORAL EVERY 6 HOURS SCHEDULED
Status: DISCONTINUED | OUTPATIENT
Start: 2024-04-16 | End: 2024-04-17 | Stop reason: HOSPADM

## 2024-04-15 RX ORDER — TERBUTALINE SULFATE 1 MG/ML
0.25 INJECTION, SOLUTION SUBCUTANEOUS AS NEEDED
Status: DISCONTINUED | OUTPATIENT
Start: 2024-04-15 | End: 2024-04-15 | Stop reason: HOSPADM

## 2024-04-15 RX ORDER — FAMOTIDINE 20 MG/1
20 TABLET, FILM COATED ORAL ONCE AS NEEDED
Status: DISCONTINUED | OUTPATIENT
Start: 2024-04-15 | End: 2024-04-15 | Stop reason: HOSPADM

## 2024-04-15 RX ORDER — FAMOTIDINE 10 MG/ML
20 INJECTION, SOLUTION INTRAVENOUS ONCE AS NEEDED
Status: DISCONTINUED | OUTPATIENT
Start: 2024-04-15 | End: 2024-04-15 | Stop reason: HOSPADM

## 2024-04-15 RX ORDER — SODIUM CHLORIDE 0.9 % (FLUSH) 0.9 %
10 SYRINGE (ML) INJECTION AS NEEDED
Status: DISCONTINUED | OUTPATIENT
Start: 2024-04-15 | End: 2024-04-15 | Stop reason: HOSPADM

## 2024-04-15 RX ORDER — IBUPROFEN 800 MG/1
800 TABLET ORAL ONCE AS NEEDED
Status: COMPLETED | OUTPATIENT
Start: 2024-04-15 | End: 2024-04-15

## 2024-04-15 RX ORDER — SODIUM CHLORIDE 9 MG/ML
40 INJECTION, SOLUTION INTRAVENOUS AS NEEDED
Status: DISCONTINUED | OUTPATIENT
Start: 2024-04-15 | End: 2024-04-15 | Stop reason: HOSPADM

## 2024-04-15 RX ORDER — LIDOCAINE HCL/EPINEPHRINE/PF 2%-1:200K
VIAL (ML) INJECTION AS NEEDED
Status: DISCONTINUED | OUTPATIENT
Start: 2024-04-15 | End: 2024-04-15 | Stop reason: SURG

## 2024-04-15 RX ORDER — CARBOPROST TROMETHAMINE 250 UG/ML
250 INJECTION, SOLUTION INTRAMUSCULAR ONCE AS NEEDED
Status: DISCONTINUED | OUTPATIENT
Start: 2024-04-15 | End: 2024-04-15 | Stop reason: HOSPADM

## 2024-04-15 RX ORDER — OXYTOCIN/0.9 % SODIUM CHLORIDE 30/500 ML
999 PLASTIC BAG, INJECTION (ML) INTRAVENOUS ONCE
Status: COMPLETED | OUTPATIENT
Start: 2024-04-15 | End: 2024-04-15

## 2024-04-15 RX ORDER — HYDROCODONE BITARTRATE AND ACETAMINOPHEN 10; 325 MG/1; MG/1
1 TABLET ORAL EVERY 4 HOURS PRN
Status: DISCONTINUED | OUTPATIENT
Start: 2024-04-15 | End: 2024-04-15 | Stop reason: HOSPADM

## 2024-04-15 RX ORDER — OXYTOCIN/0.9 % SODIUM CHLORIDE 30/500 ML
2 PLASTIC BAG, INJECTION (ML) INTRAVENOUS
Status: DISCONTINUED | OUTPATIENT
Start: 2024-04-15 | End: 2024-04-15

## 2024-04-15 RX ORDER — ONDANSETRON 2 MG/ML
4 INJECTION INTRAMUSCULAR; INTRAVENOUS EVERY 6 HOURS PRN
Status: DISCONTINUED | OUTPATIENT
Start: 2024-04-15 | End: 2024-04-17 | Stop reason: HOSPADM

## 2024-04-15 RX ORDER — MAGNESIUM CARB/ALUMINUM HYDROX 105-160MG
30 TABLET,CHEWABLE ORAL ONCE
Status: DISCONTINUED | OUTPATIENT
Start: 2024-04-15 | End: 2024-04-15 | Stop reason: HOSPADM

## 2024-04-15 RX ORDER — ONDANSETRON 4 MG/1
4 TABLET, ORALLY DISINTEGRATING ORAL EVERY 6 HOURS PRN
Status: DISCONTINUED | OUTPATIENT
Start: 2024-04-15 | End: 2024-04-15 | Stop reason: HOSPADM

## 2024-04-15 RX ORDER — CALCIUM CARBONATE 500 MG/1
2 TABLET, CHEWABLE ORAL 3 TIMES DAILY PRN
Status: DISCONTINUED | OUTPATIENT
Start: 2024-04-15 | End: 2024-04-17 | Stop reason: HOSPADM

## 2024-04-15 RX ORDER — OXYTOCIN/0.9 % SODIUM CHLORIDE 30/500 ML
250 PLASTIC BAG, INJECTION (ML) INTRAVENOUS CONTINUOUS
Status: ACTIVE | OUTPATIENT
Start: 2024-04-15 | End: 2024-04-15

## 2024-04-15 RX ORDER — BISACODYL 10 MG
10 SUPPOSITORY, RECTAL RECTAL DAILY PRN
Status: DISCONTINUED | OUTPATIENT
Start: 2024-04-16 | End: 2024-04-17 | Stop reason: HOSPADM

## 2024-04-15 RX ORDER — LIDOCAINE HCL/EPINEPHRINE/PF 2%-1:200K
VIAL (ML) INJECTION
Status: COMPLETED
Start: 2024-04-15 | End: 2024-04-15

## 2024-04-15 RX ORDER — ONDANSETRON 4 MG/1
4 TABLET, ORALLY DISINTEGRATING ORAL EVERY 8 HOURS PRN
Status: DISCONTINUED | OUTPATIENT
Start: 2024-04-15 | End: 2024-04-17 | Stop reason: HOSPADM

## 2024-04-15 RX ORDER — ACETAMINOPHEN 500 MG
1000 TABLET ORAL EVERY 6 HOURS
Status: DISCONTINUED | OUTPATIENT
Start: 2024-04-15 | End: 2024-04-17 | Stop reason: HOSPADM

## 2024-04-15 RX ORDER — FENTANYL CITRATE 50 UG/ML
INJECTION, SOLUTION INTRAMUSCULAR; INTRAVENOUS
Status: COMPLETED
Start: 2024-04-15 | End: 2024-04-15

## 2024-04-15 RX ORDER — SODIUM CHLORIDE 0.9 % (FLUSH) 0.9 %
1-10 SYRINGE (ML) INJECTION AS NEEDED
Status: DISCONTINUED | OUTPATIENT
Start: 2024-04-15 | End: 2024-04-17 | Stop reason: HOSPADM

## 2024-04-15 RX ORDER — DOCUSATE SODIUM 100 MG/1
100 CAPSULE, LIQUID FILLED ORAL DAILY
Status: DISCONTINUED | OUTPATIENT
Start: 2024-04-16 | End: 2024-04-17 | Stop reason: HOSPADM

## 2024-04-15 RX ORDER — BUPIVACAINE HCL/0.9 % NACL/PF 0.1 %
2000 PLASTIC BAG, INJECTION (ML) EPIDURAL ONCE
Status: DISCONTINUED | OUTPATIENT
Start: 2024-04-15 | End: 2024-04-15 | Stop reason: HOSPADM

## 2024-04-15 RX ORDER — HYDROCODONE BITARTRATE AND ACETAMINOPHEN 5; 325 MG/1; MG/1
1 TABLET ORAL EVERY 4 HOURS PRN
Status: DISCONTINUED | OUTPATIENT
Start: 2024-04-15 | End: 2024-04-15 | Stop reason: HOSPADM

## 2024-04-15 RX ORDER — PRENATAL VIT/IRON FUM/FOLIC AC 27MG-0.8MG
1 TABLET ORAL DAILY
Status: DISCONTINUED | OUTPATIENT
Start: 2024-04-16 | End: 2024-04-17 | Stop reason: HOSPADM

## 2024-04-15 RX ORDER — METHYLERGONOVINE MALEATE 0.2 MG/ML
200 INJECTION INTRAVENOUS ONCE AS NEEDED
Status: DISCONTINUED | OUTPATIENT
Start: 2024-04-15 | End: 2024-04-15 | Stop reason: HOSPADM

## 2024-04-15 RX ORDER — ACETAMINOPHEN 325 MG/1
650 TABLET ORAL ONCE AS NEEDED
Status: DISCONTINUED | OUTPATIENT
Start: 2024-04-15 | End: 2024-04-15 | Stop reason: HOSPADM

## 2024-04-15 RX ORDER — MISOPROSTOL 200 UG/1
800 TABLET ORAL ONCE AS NEEDED
Status: DISCONTINUED | OUTPATIENT
Start: 2024-04-15 | End: 2024-04-17 | Stop reason: HOSPADM

## 2024-04-15 RX ORDER — FAMOTIDINE 20 MG/1
20 TABLET, FILM COATED ORAL 2 TIMES DAILY PRN
Status: DISCONTINUED | OUTPATIENT
Start: 2024-04-15 | End: 2024-04-15 | Stop reason: HOSPADM

## 2024-04-15 RX ORDER — LIDOCAINE HYDROCHLORIDE AND EPINEPHRINE 15; 5 MG/ML; UG/ML
INJECTION, SOLUTION EPIDURAL
Status: COMPLETED | OUTPATIENT
Start: 2024-04-15 | End: 2024-04-15

## 2024-04-15 RX ORDER — SODIUM CHLORIDE 0.9 % (FLUSH) 0.9 %
10 SYRINGE (ML) INJECTION EVERY 12 HOURS SCHEDULED
Status: DISCONTINUED | OUTPATIENT
Start: 2024-04-15 | End: 2024-04-15 | Stop reason: HOSPADM

## 2024-04-15 RX ORDER — EPHEDRINE SULFATE 50 MG/ML
5 INJECTION, SOLUTION INTRAVENOUS
Status: DISCONTINUED | OUTPATIENT
Start: 2024-04-15 | End: 2024-04-15 | Stop reason: HOSPADM

## 2024-04-15 RX ORDER — CITRIC ACID/SODIUM CITRATE 334-500MG
30 SOLUTION, ORAL ORAL ONCE AS NEEDED
Status: DISCONTINUED | OUTPATIENT
Start: 2024-04-15 | End: 2024-04-15 | Stop reason: HOSPADM

## 2024-04-15 RX ORDER — SODIUM CHLORIDE, SODIUM LACTATE, POTASSIUM CHLORIDE, CALCIUM CHLORIDE 600; 310; 30; 20 MG/100ML; MG/100ML; MG/100ML; MG/100ML
125 INJECTION, SOLUTION INTRAVENOUS CONTINUOUS
Status: DISCONTINUED | OUTPATIENT
Start: 2024-04-15 | End: 2024-04-15

## 2024-04-15 RX ORDER — METHYLERGONOVINE MALEATE 0.2 MG/ML
200 INJECTION INTRAVENOUS ONCE AS NEEDED
Status: DISCONTINUED | OUTPATIENT
Start: 2024-04-15 | End: 2024-04-17 | Stop reason: HOSPADM

## 2024-04-15 RX ORDER — CARBOPROST TROMETHAMINE 250 UG/ML
250 INJECTION, SOLUTION INTRAMUSCULAR
Status: DISCONTINUED | OUTPATIENT
Start: 2024-04-15 | End: 2024-04-17 | Stop reason: HOSPADM

## 2024-04-15 RX ORDER — FENTANYL CITRATE 50 UG/ML
INJECTION, SOLUTION INTRAMUSCULAR; INTRAVENOUS
Status: COMPLETED | OUTPATIENT
Start: 2024-04-15 | End: 2024-04-15

## 2024-04-15 RX ORDER — MORPHINE SULFATE 5 MG/ML
5 INJECTION, SOLUTION INTRAMUSCULAR; INTRAVENOUS
Status: DISCONTINUED | OUTPATIENT
Start: 2024-04-15 | End: 2024-04-15 | Stop reason: HOSPADM

## 2024-04-15 RX ORDER — MISOPROSTOL 200 UG/1
800 TABLET ORAL AS NEEDED
Status: DISCONTINUED | OUTPATIENT
Start: 2024-04-15 | End: 2024-04-15 | Stop reason: HOSPADM

## 2024-04-15 RX ORDER — BUPIVACAINE HCL/0.9 % NACL/PF 0.1 %
2000 PLASTIC BAG, INJECTION (ML) EPIDURAL ONCE
Status: DISCONTINUED | OUTPATIENT
Start: 2024-04-15 | End: 2024-04-15

## 2024-04-15 RX ORDER — ACETAMINOPHEN 325 MG/1
650 TABLET ORAL EVERY 4 HOURS PRN
Status: DISCONTINUED | OUTPATIENT
Start: 2024-04-15 | End: 2024-04-15 | Stop reason: HOSPADM

## 2024-04-15 RX ORDER — OXYTOCIN/0.9 % SODIUM CHLORIDE 30/500 ML
125 PLASTIC BAG, INJECTION (ML) INTRAVENOUS ONCE AS NEEDED
Status: DISCONTINUED | OUTPATIENT
Start: 2024-04-15 | End: 2024-04-17 | Stop reason: HOSPADM

## 2024-04-15 RX ADMIN — Medication 10 ML/HR: at 13:37

## 2024-04-15 RX ADMIN — Medication: at 21:11

## 2024-04-15 RX ADMIN — IBUPROFEN 800 MG: 800 TABLET, FILM COATED ORAL at 18:34

## 2024-04-15 RX ADMIN — LIDOCAINE HYDROCHLORIDE AND EPINEPHRINE 3 ML: 15; 5 INJECTION, SOLUTION EPIDURAL at 13:31

## 2024-04-15 RX ADMIN — Medication 999 ML/HR: at 17:55

## 2024-04-15 RX ADMIN — SODIUM CHLORIDE, POTASSIUM CHLORIDE, SODIUM LACTATE AND CALCIUM CHLORIDE 1000 ML: 600; 310; 30; 20 INJECTION, SOLUTION INTRAVENOUS at 10:36

## 2024-04-15 RX ADMIN — MISOPROSTOL 800 MCG: 200 TABLET ORAL at 17:56

## 2024-04-15 RX ADMIN — OXYTOCIN 2 MILLI-UNITS/MIN: 10 INJECTION, SOLUTION INTRAMUSCULAR; INTRAVENOUS at 12:38

## 2024-04-15 RX ADMIN — FENTANYL CITRATE 100 MCG: 50 INJECTION, SOLUTION INTRAMUSCULAR; INTRAVENOUS at 13:36

## 2024-04-15 RX ADMIN — LIDOCAINE HYDROCHLORIDE,EPINEPHRINE BITARTRATE 5 ML: 20; .005 INJECTION, SOLUTION EPIDURAL; INFILTRATION; INTRACAUDAL; PERINEURAL at 13:36

## 2024-04-15 RX ADMIN — ACETAMINOPHEN 1000 MG: 500 TABLET ORAL at 22:30

## 2024-04-15 RX ADMIN — LIDOCAINE HYDROCHLORIDE AND EPINEPHRINE 2 ML: 15; 5 INJECTION, SOLUTION EPIDURAL at 13:36

## 2024-04-15 RX ADMIN — WITCH HAZEL: 500 SOLUTION RECTAL; TOPICAL at 21:11

## 2024-04-15 NOTE — ANESTHESIA PREPROCEDURE EVALUATION
Anesthesia Evaluation     Patient summary reviewed and Nursing notes reviewed   no history of anesthetic complications:   NPO Solid Status: > 8 hours  NPO Liquid Status: > 2 hours           Airway   Mallampati: II  TM distance: >3 FB  Dental - normal exam     Pulmonary - negative pulmonary ROS and normal exam   Cardiovascular - negative cardio ROS  Exercise tolerance: good (4-7 METS)    Rhythm: regular  Rate: abnormal        Neuro/Psych- negative ROS  GI/Hepatic/Renal/Endo    (+) GERD well controlled    Musculoskeletal (-) negative ROS    Abdominal    Substance History - negative use     OB/GYN    (+) Pregnant        Other - negative ROS       ROS/Med Hx Other:                Anesthesia Plan    ASA 2     epidural       Anesthetic plan, risks, benefits, and alternatives have been provided, discussed and informed consent has been obtained with: patient.  Pre-procedure education provided  Plan discussed with CRNA.    CODE STATUS:    Code Status (Patient has no pulse and is not breathing): CPR (Attempt to Resuscitate)  Medical Interventions (Patient has pulse or is breathing): Full Support

## 2024-04-15 NOTE — L&D DELIVERY NOTE
[facility]   Vaginal Delivery Note    Patient Name: Elisha Patten  : 2000  MRN: [id number]    Date of Delivery: 4/15/2024     Diagnosis     Pre & Post-Delivery:  Intrauterine pregnancy at 37w3d  Labor status: Spontaneous Onset of Labor     PROM with onset of labor within 24 hours of rupture     (normal spontaneous vaginal delivery)             Problem List    Transfer to Postpartum     Review the Delivery Report for details.     Delivery     Delivery: Vaginal, Spontaneous     YOB: 2024    Time of Birth:  Gestational Age 5:43 PM   37w3d     Anesthesia: Epidural     Delivering clinician: Arsenio Saunders    Forceps?   No   Vacuum? No    Shoulder dystocia present: No        Delivery narrative:     Procedure: Spontaneous vaginal delivery   Attending: Arsenio Saunders MD  EBL: 300 ml  Infant: female  infant   3204 g (7 lb 1 oz)   APGARS: 8  @ 1 minute / 9  @ 5 minutes  Specimen: placenta  Complications: none  Optimal cord clamping: Yes    Delivery: The patient pushed for a spontaneous vaginal delivery after pushing for approximately an hour and half. Fetal head restitution to maternal left.   A nuchal cord was not present.  With gentle downward pressure the anterior shoulder delivered without incident. The remainder of the  delivered without incident. At time of birth the  demonstrated good color, tone, grimace and weak cry. Fetal heart was above 100 beats per minute. Delayed cord clamping was performed for 60 seconds. The umbilical cord was doubly clamped and cut. The infant was handed off to the waiting staff.  Gases were not sent.  The placenta did follow spontaneously.  The uterus was not explored.  The perineum was examined for laceration.  A perineal laceration was not sustained. A midline vaginal laceration was sustained and was re-approximated with 3-0 Vicryl in figure of eight fashion. A rectal examination was performed with no palpable defect or suture material  "appreciated. 800 mcg of cytotec was placed rectally for hemorrhage prophylaxis.  Mother and  doing well in labor and delivery suite upon exiting.    Perineum : Vaginal midline          Infant     Findings: female  infant     Infant observations: Weight: 3204 g (7 lb 1 oz)   Length:   in  Observations/Comments:        Apgars: 8  @ 1 minute /    9  @ 5 minutes   Infant Name: Roderick     Placenta & Cord         Placenta delivered  Spontaneous  at   4/15/2024  5:48 PM     Cord: 3 vessels  present.   Nuchal Cord?  no   Cord blood obtained: Yes    Cord gases obtained:  No    Cord gas results: Venous:  No results found for: \"PHCVEN\", \"BECVEN\"    Arterial:  No results found for: \"PHCART\", \"BECART\"     Repair     Episiotomy: Not recorded     No    Lacerations: Yes  Laceration Information  Laceration Repaired?   Perineal:       Periurethral:       Labial:       Sulcus:       Vaginal: Yes  Yes    Cervical:         Suture used for repair: 3-0 Vicryl     Estimated Blood Loss:  300 mL      Quantitative Blood Loss:          Complications     none    Disposition     Mother to Mother Baby/Postpartum  in stable condition currently.  Baby to remains with mom  in stable condition currently.    Arsenio Saunders MD  04/15/24  18:05 EDT        "

## 2024-04-15 NOTE — PROGRESS NOTES
Lab Results (last 24 hours)       Procedure Component Value Units Date/Time    CBC (No Diff) [[id number]]  (Abnormal) Collected: 04/15/24 1035    Specimen: Blood Updated: 04/15/24 1059     WBC 17.17 10*3/mm3      RBC 4.09 10*6/mm3      Hemoglobin 11.4 g/dL      Hematocrit 34.5 %      MCV 84.4 fL      MCH 27.9 pg      MCHC 33.0 g/dL      RDW 15.0 %      RDW-SD 45.7 fl      MPV 10.2 fL      Platelets 230 10*3/mm3     T Pallidum Antibody w/ reflex RPR (Syphilis) [[id number]]  (Normal) Collected: 04/15/24 1035    Specimen: Blood Updated: 04/15/24 1127     Treponemal AB Total Non-Reactive    Urine Drug Screen - Urine, Clean Catch [[id number]]  (Normal) Collected: 04/15/24 1035    Specimen: Urine, Clean Catch Updated: 04/15/24 1135     Amphet/Methamphet, Screen Negative     Barbiturates Screen, Urine Negative     Benzodiazepine Screen, Urine Negative     Cocaine Screen, Urine Negative     Opiate Screen Negative     THC, Screen, Urine Negative     Methadone Screen, Urine Negative     Oxycodone Screen, Urine Negative     Fentanyl, Urine Negative    Narrative:      Negative Thresholds Per Drugs Screened:    Amphetamines                 500 ng/ml  Barbiturates                 200 ng/ml  Benzodiazepines              100 ng/ml  Cocaine                      300 ng/ml  Methadone                    300 ng/ml  Opiates                      300 ng/ml  Oxycodone                    100 ng/ml  THC                           50 ng/ml  Fentanyl                       5 ng/ml      The Normal Value for all drugs tested is negative. This report includes final unconfirmed screening results to be used for medical treatment purposes only. Unconfirmed results must not be used for non-medical purposes such as employment or legal testing. Clinical consideration should be applied to any drug of abuse test, particularly when unconfirmed results are used.            Protein / Creatinine Ratio, Urine - Urine, Clean Catch [[id number]] Collected: 04/15/24  1035    Specimen: Urine, Clean Catch Updated: 04/15/24 1133     Creatinine, Urine 29.2 mg/dL      Total Protein, Urine 5.4 mg/dL      Protein/Creatinine Ratio, Urine 0.18    Comprehensive Metabolic Panel [[id number]]  (Abnormal) Collected: 04/15/24 1035    Specimen: Blood Updated: 04/15/24 1121     Glucose 91 mg/dL      BUN 9 mg/dL      Creatinine 0.57 mg/dL      Sodium 136 mmol/L      Potassium 3.8 mmol/L      Chloride 100 mmol/L      CO2 22.1 mmol/L      Calcium 9.0 mg/dL      Total Protein 6.8 g/dL      Albumin 3.7 g/dL      ALT (SGPT) 12 U/L      AST (SGOT) 12 U/L      Alkaline Phosphatase 135 U/L      Total Bilirubin <0.2 mg/dL      Globulin 3.1 gm/dL      A/G Ratio 1.2 g/dL      BUN/Creatinine Ratio 15.8     Anion Gap 13.9 mmol/L      eGFR 130.3 mL/min/1.73     Narrative:      GFR Normal >60  Chronic Kidney Disease <60  Kidney Failure <15            Queenie Toro MD  4/15/2024  11:59 EDT

## 2024-04-15 NOTE — PLAN OF CARE
Problem: Adult Inpatient Plan of Care  Goal: Plan of Care Review  Outcome: Ongoing, Progressing  Goal: Patient-Specific Goal (Individualized)  Outcome: Ongoing, Progressing  Goal: Absence of Hospital-Acquired Illness or Injury  Outcome: Ongoing, Progressing  Goal: Optimal Comfort and Wellbeing  Outcome: Ongoing, Progressing  Goal: Readiness for Transition of Care  Outcome: Ongoing, Progressing  Intervention: Mutually Develop Transition Plan  Description: Identify available resources for support (e.g., family, friends, community).  Identify and address barriers to ongoing treatment and home management (e.g., environmental, financial).  Provide opportunities to practice self-management skills.  Assess and monitor emotional readiness for transition.  Establish or reconnect linkage with outpatient providers or community-based services.  Recent Flowsheet Documentation  Taken 4/15/2024 1128 by Laura Rankin, RN  Transportation Anticipated: family or friend will provide  Patient/Family Anticipated Services at Transition: none  Patient/Family Anticipates Transition to: home with family  Taken 4/15/2024 0951 by Laura Rankin, RN  Equipment Currently Used at Home: none   Goal Outcome Evaluation:

## 2024-04-15 NOTE — H&P
[facility]  Obstetric History and Physical    Chief Complaint   Patient presents with    Rupture of Membranes     At 0500 am       Subjective     HPI:    Patient is a 24 y.o. female  currently at 37w3d, who presents to OB ED with/for possible rupture of membranes at 5:23 am. States it was clear and then had mucous and more fluid. Having contractions - mild.    She has been seeing [facility] for her prenatal care.  The pregnancy has been complicated by the following problems:    Patient Active Problem List   Diagnosis    Family history of diabetes mellitus    Family history of stroke    Allergic rhinitis    Supervision of other normal pregnancy, antepartum    Rubella nonimmune     In addition she had an episode of elevated BP at 34 wks. She underwent PIH workup which was negative at that time and no further significantly elevated Bps.    The following portions of the patients history were reviewed and updated as appropriate:   current medications, allergies, past medical history, past surgical history, past family history, past social history and current problem list.     Prenatal Information:  Prenatal Results       Initial Prenatal Labs       Test Value Reference Range Date Time    Hemoglobin  13.6 g/dL 12.0 - 15.9 10/30/23 1543    Hematocrit  38.6 % 34.0 - 46.6 10/30/23 1543    Platelets  300 10*3/mm3 140 - 450 10/30/23 1543    Rubella IgG  <0.90 index Immune >0.99 10/30/23 1543    Hepatitis B SAg  Non-Reactive  Non-Reactive 10/30/23 1543    Hepatitis C Ab  Non-Reactive  Non-Reactive 10/30/23 1543    RPR        T. Pallidum Ab   Non-Reactive  Non-Reactive 10/30/23 1543    ABO  A   10/30/23 1543    Rh  Positive   10/30/23 1543    Antibody Screen  Negative   10/30/23 1543    HIV  Non-Reactive  Non-Reactive 10/30/23 1543    Urine Culture  No growth   10/30/23 1517    Gonorrhea  Not Detected  Not Detected  10/30/23 1517    Chlamydia  Not Detected  Not Detected  10/30/23 1517    TSH        HgB A1c   4.60 % 4.80 - 5.60  10/30/23 1543                2nd and 3rd Trimester       Test Value Reference Range Date Time    Hemoglobin (repeated)  11.0 g/dL 12.0 - 15.9 24 1656       12.5 g/dL 12.0 - 15.9 24 1428    Hematocrit (repeated)  34.4 % 34.0 - 46.6 24 1656       36.8 % 34.0 - 46.6 24 1428    Platelets   254 10*3/mm3 140 - 450 24 1656       311 10*3/mm3 140 - 450 24 1428       300 10*3/mm3 140 - 450 10/30/23 1543    GCT  103 mg/dL 65 - 139 24 1428       89 mg/dL 65 - 139 23 1227    Group B Strep  Positive  Negative 24 1426                Drug Screening       Test Value Reference Range Date Time    Amphetamine Screen        Barbiturate Screen  Negative  Negative 10/30/23 1517    Benzodiazepine Screen  Negative  Negative 10/30/23 1517    Methadone Screen  Negative  Negative 10/30/23 1517    Phencyclidine Screen        Opiates Screen  Negative  Negative 10/30/23 1517    THC Screen  Negative  Negative 10/30/23 1517    Cocaine Screen  Negative  Negative 10/30/23 1517    Oxycodone Screen  Negative  Negative 10/30/23 1517                    Past OB History:     OB History    Para Term  AB Living   1 0 0 0 0 0   SAB IAB Ectopic Molar Multiple Live Births   0 0 0 0 0 0      # Outcome Date GA Lbr [place]/2nd Weight Sex Type Anes PTL Lv   1 Current                Past Medical History: Past Medical History:   Diagnosis Date    Seasonal allergies       Past Surgical History History reviewed. No pertinent surgical history.   Family History: Family History   Problem Relation Age of Onset    Hyperlipidemia Father     Diabetes Mother         DMT2    No Known Problems Brother     No Known Problems Sister     No Known Problems Son     No Known Problems Daughter     No Known Problems Paternal Grandfather     No Known Problems Paternal Grandmother     Hyperlipidemia Maternal Grandmother     Hyperlipidemia Maternal Grandfather     Diabetes Paternal Uncle         DMT2    Diabetes Paternal Uncle          DMT1    No Known Problems Cousin     No Known Problems Other     Rheum arthritis Neg Hx     Osteoarthritis Neg Hx     Asthma Neg Hx     Heart failure Neg Hx     Hypertension Neg Hx     Migraines Neg Hx     Rashes / Skin problems Neg Hx     Seizures Neg Hx     Stroke Neg Hx     Thyroid disease Neg Hx     Breast cancer Neg Hx     Uterine cancer Neg Hx     Ovarian cancer Neg Hx     Colon cancer Neg Hx       Social History:  reports that she has never smoked. She has never been exposed to tobacco smoke. She has never used smokeless tobacco.   reports that she does not currently use alcohol.   reports no history of drug use.        General ROS: Pertinent items are noted in HPI  Home Medications:  Prenatal Vit-Fe Fumarate-FA and loratadine    Allergies:  Allergies   Allergen Reactions    Penicillins Unknown - High Severity     Pt reports father is allergic to pencillin       Objective       Vital Signs Range for the last 24 hours  Temperature: Temp:  [98.1 °F (36.7 °C)] 98.1 °F (36.7 °C)   Temp Source: Temp src: Oral   BP: BP: (134-136)/(91-96) 134/96   Pulse: Heart Rate:  [] 99   Respirations: Resp:  [16] 16   SPO2: SpO2:  [98 %] 98 %     Physical Examination:   General appearance - alert, well appearing, and in no distress  Mental status - alert, oriented to person, place, and time  Chest - normal effort; CTA  Heart - normal rate, regular rhythm  Abdomen - soft, nondistended; no rebound or guarding  Pelvic - external genitalia with scant mucous/fluid; SVE fluid seen in vault along with membranes; cervix 4/90/-2  Neurological - alert, oriented, normal speech  Extremities - Edema none; DTR 1+  Skin - normal coloration and turgor, no suspicious skin lesions noted    Presentation: cephalic    [place]: occasional contractions   NST: 130 with accels/no decels    Assessment & Plan     Assessment:  -  Intrauterine pregnancy at 37w3d gestation who presents for: leaking fluid and occasional contractions  -  GBS status:  "  Group B Strep, DNA   Date Value Ref Range Status   04/05/2024 Positive (A) Negative Final       Plan:  - ROM confirmed with SSE. Admit , IVF. Currently declines epidural or other pain medications. She is GBS positive. Allergies state allergic to PCN but reaction is \"father is allergic to PCN\". Will treat GBS with ancef instead of PCN.  - BP mildly elevated. Will check CMP and P/C. Most likely due to discomfort with natural labor but will monitor closely.  - Plan of care has been reviewed with patient and patient agrees.   - Risks, benefits of treatment plan have been discussed.  - All questions have been answered.        Electronically signed by Queenie Toro MD, 04/15/24, 9:52 AM EDT.      "

## 2024-04-15 NOTE — PROGRESS NOTES
OB Intrapartum Note    Subjective:  tolerating contractions, feeling about every 5 minutes.     Objective:  Baseline: 130  Variability:   Moderate/Normal (amplitude 6-25 bpm)  Accelerations: Present (32 weeks+) 15 x 15 bpm  Decelerations: None  Contractions:  Not picking up on tocometer; reports feeling about every 5 minutes    Cervical exam:    Dilation: 4cm    Effacement: 90%    Station: -2    Impression:    Category I  Reassuring fetus, Lack of adequate progress    Plan:   Begin Pitocin 2x2, Max of 20. Recheck in 2-3 hours   Start pitocin  Continue to monitor  Allow to rest and decend  Pelvis feels clinically adequate  Reviewed course/progress/plan with pt.  All questions answered to pts satisfaction.  Pt desires to proceed as outlined.  I have discussed in detail with patient the current plan to include, but NLT, appropriate expectations for labor and delivery, to include possible length of time expected for delivery and hospital stay.  All questions have been answered to pts satisfaction and pt desires to proceed as noted.    The patient was counseled for the possible need of  section.  The risk, benefits, and alternatives of surgery were discussed with the patient.  The risks discussed included but were not limited to:  bleeding  infection  injury to surrounding structures including bowel and bladder  injury to vasculature  injury to   thrombosis  need for  hysterectomy  Risk of disfiguring scar  Need for blood transfusion  Maternal mortality    Patient willing to accept blood products after discussion of risk, benefits and alternatives  Patient willing to accept  hysterectomy in lifesaving situation.     Arsenio Saunders MD          Electronically signed by Arsenio Saunders MD, 04/15/24, 12:15 PM EDT.

## 2024-04-15 NOTE — ANESTHESIA PROCEDURE NOTES
Labor Epidural    Pre-sedation assessment completed: 4/15/2024 1:24 PM    Patient reassessed immediately prior to procedure    Patient location during procedure: OB  Start Time: 4/15/2024 1:26 PM  Stop Time: 4/15/2024 1:35 PM  Performed By  CRNA/CAA: Brenda Wallis CRNA  Preanesthetic Checklist  Completed: patient identified, IV checked, risks and benefits discussed, surgical consent, monitors and equipment checked, pre-op evaluation and timeout performed  Additional Notes  Epidural on first attempt with one redirection to right after patient complaint of pain on left side of back. Pain gone after redirection.  Prep:  Pt Position:sitting  Sterile Tech:cap, gloves, sterile barrier, mask and gown  Prep:povidone-iodine 7.5% surgical scrub  Monitoring:blood pressure monitoring, continuous pulse oximetry and EKG  Epidural Block Procedure:  Approach:midline  Guidance:landmark technique and palpation technique  Location:L3-L4  Needle Type:Tuohy  Needle Gauge:17 G  Loss of Resistance Medium: air  Loss of Resistance: 7cm  Cath Depth at skin:12 cm  Paresthesia: none  Aspiration:negative  Test Dose:negative  Medication: lidocaine 1.5%-EPINEPHrine 1:200,000 (XYLOCAINE W/EPI) injection - Epidural   3 mL - 4/15/2024 1:31:00 PM   2 mL - 4/15/2024 1:36:00 PM  fentaNYL citrate (PF) (SUBLIMAZE) injection - Epidural   100 mcg - 4/15/2024 1:36:00 PM  Number of Attempts: 1  Post Assessment:  Dressing:occlusive dressing applied and secured with tape  Pt Tolerance:patient tolerated the procedure well with no apparent complications  Complications:no

## 2024-04-15 NOTE — DISCHARGE INSTRUCTIONS
DR. CROWLEY'S POSTPARTUM DISCHARGE PRECAUTIONS and Answers to FAQs    NO SEX for [SIX] weeks.    NO TUB BATH or POOL for [TWO] week(s), shower only.  Sitz baths are fine.    STITCHES (if present):  wash them daily in the shower with soap and water (any type of soap is fine, it does not need to be antibacterial soap).  It is ok to gently put your finger in and around the vaginal area.  Look at your stitches (the ones on the outside) when you get home.  You will then know what is normal and can have a point of reference to compare it to if you start to have concerns.  REDNESS, PUS, increase in PAIN, FEVER or CHILLS are all reasons to be seen our office immediately.  Go to the ER, if it is after hours or a weekend.      VAGINAL BLEEDING:  may continue on and off over the next several weeks after delivery and may increase slightly once you go home.  You should not be bleeding more than 1 large pad soaked every hour or two.  Clots (even the size of a lemon or larger) may be normal as long as the bleeding is not heavy and the clots do not continue.      FEVER or CHILLS or NOT FEELING WELL: call our office.  If the office is closed, you need to be seen in acute care or ER.      CHEST PAIN or SHORTNESS OF BREATH/AIR: you need to GO TO THE NEAREST ER or CALL 911.     SWELLING: can increase over the next 7-10 days and then should slowly improve.  Your legs/ankles should be fairly similar in size.  A red, painful, hot, swollen leg (usually just one side) can be a sign of a blood clot and should be evaluated immediately.  Call our office.  If it is after hours or a weekend, you must be seen IMMEDIATELY IN THE ER.     ELEVATED BLOOD PRESSURE:  you need to contact us if you are having  persistent elevated BP systolic (top number) more than [155] or diastolic (bottom number) more than [95], or a headache (not relieved with rest, hydration or over the counter pain reliever), an increase in your swelling (usually hands and face),  changes in your vision (typically flashing white or black spots) or severe persistent pain in the location of the upper right side of your belly (under your right breast).  Call our office or go to ER if after hours or a weekend.    LACTATION QUESTIONS or CONCERNS?  Call [facility] Lactation Support [phone].    WORK and SCHOOL TIME OFF: depends on your specific delivery type, surrounding circumstances, and your work insurance/school rules.  If you have questions, please call Mitzy or Eden at [phone] (ext. [phone]).  Or email Mitzy at [email or web address].  They will assist in required paperwork for you and/or family members.     Any further QUESTIONS or CONCERNS, please call [facility] at [phone].

## 2024-04-15 NOTE — PROGRESS NOTES
OB Intrapartum Note    Subjective: No complaints, Comfortable with epidural, feeling more pressure    Objective:  Baseline: 110-115  Variability:   Moderate/Normal (amplitude 6-25 bpm)  Accelerations: Present (32 weeks+) 15 x 15 bpm  Decelerations: Early, head compression vs late deceleration  Contractions:  Regular    Cervical exam:    Dilation: AL    Effacement: 100%    Station: 0/Engaged    Impression:    Category II  Reassuring fetus, FSE placed, IUPC placed    Plan:   Tocometer not picking up contractions, concern for late decelerations vs. Head compression. IUPC and FSE placed. Rapid cervical change. Prepare for vaginal delivery. Throne position for to assist with fetal decent.    Continue pitocin  Continue to monitor  Allow to rest and decend  Pelvis feels clinically adequate  Reviewed course/progress/plan with pt.  All questions answered to pts satisfaction.  Pt desires to proceed as outlined.  I have discussed in detail with patient the current plan to include, but NLT, appropriate expectations for labor and delivery, to include possible length of time expected for delivery and hospital stay.  All questions have been answered to pts satisfaction and pt desires to proceed as noted.        Electronically signed by Arsenio Saunders MD, 04/15/24, 3:12 PM EDT.

## 2024-04-15 NOTE — DISCHARGE SUMMARY
"             [facility]         DISCHARGE SUMMARY    Patient Name: Elisha Patten  : 2000  MRN: [id number]    Date of Admission: 4/15/2024  Date of Discharge:  2024   Primary Care Physician: Lela Mckee MD    Consults       No orders found from 3/17/2024 to 2024.             Procedures:  Normal spontaneous vaginal delivery    Presenting Problem:   PROM with onset of labor within 24 hours of rupture [O42.00]  Term pregnancy, 37w3d   Elevated blood pressure without diagnoses of hypertension     Admitting Diagnosis:  PROM with onset of labor within 24 hours of rupture  Term pregnancy, 37w3d    Discharge Diagnosis:  Vaginal delivery, delivered    Delivery Summary     OB Surgeon:  Arsenio Saunders MD  Anesthesia: Epidural  Delivery Type:   Perineum: OBPERINEUM: Vaginal midline   Feeding method: Breast    Infant: female  infant;    Weight: 3210 g (7 lb 1.2 oz)     APGARS: 8  @ 1 minute / 9  @ 5 minutes   Venous Blood Gas: No results found for: \"PHCVEN\", \"BECVEN\"   Arterial Blood Gas: No results found for: \"PHCART\", \"BECART\"     Hospital Course     Hospital Course:  Elisha Patten is a 24 y.o.  37w3d who presented on 4/15/2024 for spontaneous rupture of membranes and labor. Patient was found to be grossly ruptured with contraction approximately 5 minutes apart. Patient was admitted for expectanat management and started on Ancef for GBS prophylaxis secondary to reported history of allergy to penicillin. After expectant management for approximately 2 hours no cervical change was appreciated and pitocin augmentation was initiated. Patient received epidural. Patient progressed to complete dilation and pushed for a spontaneous vaginal delivery. See delivery documentation for details. Her postpartum course was uncomplicated. By day of discharge her vital signs were stable, she was voiding, ambulating, eating and pain was tolerable. She was deemed stable for discharge with follow up " in the outpatient setting.      Day of Discharge     Vital Signs:  Temp:  [97.7 °F (36.5 °C)-98 °F (36.7 °C)] 97.7 °F (36.5 °C)  Heart Rate:  [86-96] 86  Resp:  [16-17] 16  BP: (113-123)/(76-81) 115/78    Pertinent  and/or Most Recent Results     LAB RESULTS:       Lab 04/15/24  1035   WBC 17.17*   HEMOGLOBIN 11.4*   HEMATOCRIT 34.5   PLATELETS 230   MCV 84.4         Lab 04/15/24  1035   SODIUM 136   POTASSIUM 3.8   CHLORIDE 100   CO2 22.1   ANION GAP 13.9   BUN 9   CREATININE 0.57   EGFR 130.3   GLUCOSE 91   CALCIUM 9.0         Lab 04/15/24  1035   TOTAL PROTEIN 6.8   ALBUMIN 3.7   GLOBULIN 3.1   ALT (SGPT) 12   AST (SGOT) 12   BILIRUBIN <0.2   ALK PHOS 135*             Lab 04/15/24  1035   ABO TYPING A   RH TYPING Positive   ANTIBODY SCREEN Negative     URINALYSIS@  Microbiology Results (last 10 days)       ** No results found for the last 240 hours. **               Discharge Details        Discharge Medications        New Medications        Instructions Start Date   acetaminophen 500 MG tablet  Commonly known as: TYLENOL   1,000 mg, Oral, Every 6 Hours      ibuprofen 600 MG tablet  Commonly known as: ADVIL,MOTRIN   600 mg, Oral, Every 6 Hours Scheduled             Continue These Medications        Instructions Start Date   Claritin 10 MG tablet  Generic drug: loratadine   10 mg, Oral, Daily      PRENATAL VITAMIN PO   Oral               Allergies   Allergen Reactions    Penicillins Unknown - High Severity     Pt reports father is allergic to pencillin       Discharge Disposition:   Home, self-care    Discharge Condition:  Good    Diet:   Regular    Discharge Activity:    See detailed discharge instructions.     Follow Up:  Dr. Saunders in 2 weeks for BP check     Electronically signed by Arsenio Saunders MD

## 2024-04-16 ENCOUNTER — TELEPHONE (OUTPATIENT)
Dept: OBSTETRICS AND GYNECOLOGY | Facility: CLINIC | Age: 24
End: 2024-04-16

## 2024-04-16 PROCEDURE — 0503F POSTPARTUM CARE VISIT: CPT | Performed by: STUDENT IN AN ORGANIZED HEALTH CARE EDUCATION/TRAINING PROGRAM

## 2024-04-16 RX ADMIN — ACETAMINOPHEN 1000 MG: 500 TABLET ORAL at 23:32

## 2024-04-16 RX ADMIN — IBUPROFEN 600 MG: 600 TABLET, FILM COATED ORAL at 01:52

## 2024-04-16 RX ADMIN — ACETAMINOPHEN 1000 MG: 500 TABLET ORAL at 11:39

## 2024-04-16 RX ADMIN — DOCUSATE SODIUM 100 MG: 100 CAPSULE, LIQUID FILLED ORAL at 09:45

## 2024-04-16 RX ADMIN — ACETAMINOPHEN 1000 MG: 500 TABLET ORAL at 04:57

## 2024-04-16 RX ADMIN — ACETAMINOPHEN 1000 MG: 500 TABLET ORAL at 17:27

## 2024-04-16 RX ADMIN — IBUPROFEN 600 MG: 600 TABLET, FILM COATED ORAL at 23:32

## 2024-04-16 RX ADMIN — IBUPROFEN 600 MG: 600 TABLET, FILM COATED ORAL at 13:21

## 2024-04-16 RX ADMIN — VITAMIN A, VITAMIN C, VITAMIN D, VITAMIN E, THIAMINE, RIBOFLAVIN, NIACIN, VITAMIN B6, FOLIC ACID, VITAMIN B12, CALCIUM, IRON, ZINC, COPPER 1 TABLET: 4000; 120; 400; 22; 1.84; 3; 20; 10; 1; 12; 200; 27; 25; 2 TABLET ORAL at 09:45

## 2024-04-16 RX ADMIN — IBUPROFEN 600 MG: 600 TABLET, FILM COATED ORAL at 09:45

## 2024-04-16 RX ADMIN — IBUPROFEN 600 MG: 600 TABLET, FILM COATED ORAL at 20:54

## 2024-04-16 NOTE — TELEPHONE ENCOUNTER
Caller: HERBER    Relationship to patient: L&D    Best call back number: [phone]     Type of visit: POSTPARTUM    Requested date: 4/30/24     Additional notes: PATIENT HAD VAGINAL DELIVERY 4/15/24 AND NEEDS POSTPARTUM VISIT FOR BP CHECK W/ DR CROWLEY IN TWO WEEKS - HUB UNABLE TO WARM TRANSFER

## 2024-04-16 NOTE — PROGRESS NOTES
"PostPartum/PostOp PROGRESS NOTE        Subjective:  Patient has no complaints  Pain controlled  Tolerating a regular diet  Passing flatus  Ambulating  Urinating spontaneously  Lochia decreasing, no bleeding concerns  Denies HA, vision change, or RUQ/epigastric pain  No lightheadedness or dizziness    Objective:  Vitals: Blood pressure 120/78, pulse 98, temperature 98 °F (36.7 °C), temperature source Oral, resp. rate 18, height 160 cm (63\"), weight 90.3 kg (199 lb), last menstrual period 07/28/2023, SpO2 96%, currently breastfeeding.          General: NAD, alert and oriented, appropriate  Psych: Normal mood, appropriate  Abdomen: Fundus firm, non tender and Fundus at U-2  Extremeties: Trace edema    Labs:  Lab Results (last 24 hours)       Procedure Component Value Units Date/Time    CBC (No Diff) [[id number]]  (Abnormal) Collected: 04/15/24 1035    Specimen: Blood Updated: 04/15/24 1059     WBC 17.17 10*3/mm3      RBC 4.09 10*6/mm3      Hemoglobin 11.4 g/dL      Hematocrit 34.5 %      MCV 84.4 fL      MCH 27.9 pg      MCHC 33.0 g/dL      RDW 15.0 %      RDW-SD 45.7 fl      MPV 10.2 fL      Platelets 230 10*3/mm3     T Pallidum Antibody w/ reflex RPR (Syphilis) [[id number]]  (Normal) Collected: 04/15/24 1035    Specimen: Blood Updated: 04/15/24 1127     Treponemal AB Total Non-Reactive    Urine Drug Screen - Urine, Clean Catch [[id number]]  (Normal) Collected: 04/15/24 1035    Specimen: Urine, Clean Catch Updated: 04/15/24 1135     Amphet/Methamphet, Screen Negative     Barbiturates Screen, Urine Negative     Benzodiazepine Screen, Urine Negative     Cocaine Screen, Urine Negative     Opiate Screen Negative     THC, Screen, Urine Negative     Methadone Screen, Urine Negative     Oxycodone Screen, Urine Negative     Fentanyl, Urine Negative    Narrative:      Negative Thresholds Per Drugs Screened:    Amphetamines                 500 ng/ml  Barbiturates                 200 ng/ml  Benzodiazepines              100 " ng/ml  Cocaine                      300 ng/ml  Methadone                    300 ng/ml  Opiates                      300 ng/ml  Oxycodone                    100 ng/ml  THC                           50 ng/ml  Fentanyl                       5 ng/ml      The Normal Value for all drugs tested is negative. This report includes final unconfirmed screening results to be used for medical treatment purposes only. Unconfirmed results must not be used for non-medical purposes such as employment or legal testing. Clinical consideration should be applied to any drug of abuse test, particularly when unconfirmed results are used.            Protein / Creatinine Ratio, Urine - Urine, Clean Catch [[id number]] Collected: 04/15/24 103    Specimen: Urine, Clean Catch Updated: 04/15/24 1133     Creatinine, Urine 29.2 mg/dL      Total Protein, Urine 5.4 mg/dL      Protein/Creatinine Ratio, Urine 0.18    Comprehensive Metabolic Panel [[id number]]  (Abnormal) Collected: 04/15/24 1035    Specimen: Blood Updated: 04/15/24 1121     Glucose 91 mg/dL      BUN 9 mg/dL      Creatinine 0.57 mg/dL      Sodium 136 mmol/L      Potassium 3.8 mmol/L      Chloride 100 mmol/L      CO2 22.1 mmol/L      Calcium 9.0 mg/dL      Total Protein 6.8 g/dL      Albumin 3.7 g/dL      ALT (SGPT) 12 U/L      AST (SGOT) 12 U/L      Alkaline Phosphatase 135 U/L      Total Bilirubin <0.2 mg/dL      Globulin 3.1 gm/dL      A/G Ratio 1.2 g/dL      BUN/Creatinine Ratio 15.8     Anion Gap 13.9 mmol/L      eGFR 130.3 mL/min/1.73     Narrative:      GFR Normal >60  Chronic Kidney Disease <60  Kidney Failure <15                Assessment:    Post-partum/postop Day:  1  Status post   Elevated blood pressure without diagnosis of hypertension  Rubella nonimmune    Plan:   Routine postpartum/postop care  MMR vaccination  Contraception: Unsure, [email or web address] discussed.  Patient previously has done Depo and IUD.  Breast-feeding:: Breast  Advance diet, Ambulate, Saline lock IV,  Shower, PO pain meds, Breast feeding support, PP/PO precautions given, HTN precautions reviewed in detail.  Questions answered.  RTO/ER for HA not relieved w tylenol, vision changes, epig/RUQ pain, or Bps elevated at home.  Overall doing well postpartum day #1.  Patient was GBS colonized and received prophylactic antibiotics.  Anticipate discharge postpartum day #2.    Electronically signed by Arsenio Saunders MD, 04/16/24, 7:30 AM EDT.

## 2024-04-16 NOTE — ANESTHESIA POSTPROCEDURE EVALUATION
Patient: Elisha Patten    Procedure Summary       Date: 04/15/24 Room / Location:     Anesthesia Start: 1324 Anesthesia Stop: 1743    Procedure: LABOR ANALGESIA Diagnosis:     Scheduled Providers:  Provider: Brenda Wallis CRNA    Anesthesia Type: epidural ASA Status: 2            Anesthesia Type: epidural    Vitals  Vitals Value Taken Time   /78 04/16/24 0500   Temp 36.7 °C (98 °F) 04/16/24 0500   Pulse 98 04/16/24 0500   Resp 18 04/16/24 0500   SpO2 96 % 04/15/24 1808   Vitals shown include unfiled device data.        Post Anesthesia Care and Evaluation    Patient location during evaluation: bedside  Patient participation: complete - patient participated  Level of consciousness: awake  Pain score: 0  Pain management: adequate    Airway patency: patent  Anesthetic complications: No anesthetic complications  PONV Status: controlled  Cardiovascular status: acceptable and stable  Respiratory status: acceptable  Hydration status: acceptable  Post Neuraxial Block status: Motor and sensory function returned to baseline and No signs or symptoms of PDPH

## 2024-04-16 NOTE — PLAN OF CARE
Problem: Adult Inpatient Plan of Care  Goal: Plan of Care Review  Outcome: Ongoing, Progressing  Flowsheets (Taken 4/16/2024 0551)  Progress: improving  Plan of Care Reviewed With: patient  Goal: Patient-Specific Goal (Individualized)  Outcome: Ongoing, Progressing  Goal: Absence of Hospital-Acquired Illness or Injury  Outcome: Ongoing, Progressing  Intervention: Identify and Manage Fall Risk  Recent Flowsheet Documentation  Taken 4/16/2024 0400 by Luh Gray RN  Safety Promotion/Fall Prevention: safety round/check completed  Taken 4/16/2024 0200 by Luh Gray RN  Safety Promotion/Fall Prevention: safety round/check completed  Taken 4/16/2024 0100 by Luh Gray RN  Safety Promotion/Fall Prevention: safety round/check completed  Taken 4/16/2024 0000 by Luh Gray RN  Safety Promotion/Fall Prevention: safety round/check completed  Taken 4/15/2024 2300 by Luh Gray RN  Safety Promotion/Fall Prevention: safety round/check completed  Taken 4/15/2024 2130 by Luh Gray RN  Safety Promotion/Fall Prevention: safety round/check completed  Taken 4/15/2024 2100 by Luh Gray RN  Safety Promotion/Fall Prevention: safety round/check completed  Intervention: Prevent Skin Injury  Recent Flowsheet Documentation  Taken 4/15/2024 2130 by Luh Gray RN  Body Position: position changed independently  Intervention: Prevent and Manage VTE (Venous Thromboembolism) Risk  Recent Flowsheet Documentation  Taken 4/15/2024 2130 by Luh Gray RN  Activity Management: ambulated in room  Taken 4/15/2024 2110 by Luh Gray RN  Activity Management: ambulated to bathroom  Goal: Optimal Comfort and Wellbeing  Outcome: Ongoing, Progressing  Intervention: Monitor Pain and Promote Comfort  Recent Flowsheet Documentation  Taken 4/15/2024 2130 by Luh Gray RN  Pain Management Interventions: medication offered but refused  Goal: Readiness for Transition of Care  Outcome: Ongoing, Progressing     Problem: Adjustment to Role  Transition (Postpartum Vaginal Delivery)  Goal: Successful Maternal Role Transition  Outcome: Ongoing, Progressing     Problem: Bleeding (Postpartum Vaginal Delivery)  Goal: Hemostasis  Outcome: Ongoing, Progressing     Problem: Infection (Postpartum Vaginal Delivery)  Goal: Absence of Infection Signs/Symptoms  Outcome: Ongoing, Progressing  Intervention: Prevent or Manage Infection  Recent Flowsheet Documentation  Taken 4/15/2024 2110 by Luh Gray RN  Perineal Care:   perineal hygiene encouraged   perineal spray bottle/warm water use encouraged   perineum cleansed     Problem: Pain (Postpartum Vaginal Delivery)  Goal: Acceptable Pain Control  Outcome: Ongoing, Progressing  Intervention: Prevent or Manage Pain  Recent Flowsheet Documentation  Taken 4/15/2024 2130 by Luh Gray, RN  Pain Management Interventions: medication offered but refused     Problem: Urinary Retention (Postpartum Vaginal Delivery)  Goal: Effective Urinary Elimination  Outcome: Ongoing, Progressing   Goal Outcome Evaluation:  Plan of Care Reviewed With: patient        Progress: improving

## 2024-04-16 NOTE — PLAN OF CARE
Problem: Adult Inpatient Plan of Care  Goal: Absence of Hospital-Acquired Illness or Injury  Intervention: Identify and Manage Fall Risk  Recent Flowsheet Documentation  Taken 4/15/2024 2130 by Luh Gray RN  Safety Promotion/Fall Prevention: safety round/check completed  Taken 4/15/2024 2100 by Luh Gray RN  Safety Promotion/Fall Prevention: safety round/check completed  Intervention: Prevent Skin Injury  Recent Flowsheet Documentation  Taken 4/15/2024 2130 by Luh Gray RN  Body Position: position changed independently  Intervention: Prevent and Manage VTE (Venous Thromboembolism) Risk  Recent Flowsheet Documentation  Taken 4/15/2024 2130 by Luh Gray RN  Activity Management: ambulated in room  Taken 4/15/2024 2110 by Luh Gray RN  Activity Management: ambulated to bathroom  Goal: Optimal Comfort and Wellbeing  Intervention: Monitor Pain and Promote Comfort  Recent Flowsheet Documentation  Taken 4/15/2024 2130 by Luh Gray RN  Pain Management Interventions: medication offered but refused     Problem: Change in Fetal Wellbeing (Labor)  Goal: Stable Fetal Wellbeing  Intervention: Promote and Monitor Fetal Wellbeing  Recent Flowsheet Documentation  Taken 4/15/2024 2130 by Luh Gray RN  Body Position: position changed independently     Problem: Infection (Postpartum Vaginal Delivery)  Goal: Absence of Infection Signs/Symptoms  Intervention: Prevent or Manage Infection  Recent Flowsheet Documentation  Taken 4/15/2024 2110 by Luh Gray RN  Perineal Care:   perineal hygiene encouraged   perineal spray bottle/warm water use encouraged   perineum cleansed     Problem: Pain (Postpartum Vaginal Delivery)  Goal: Acceptable Pain Control  Intervention: Prevent or Manage Pain  Recent Flowsheet Documentation  Taken 4/15/2024 2130 by Luh Gray RN  Pain Management Interventions: medication offered but refused   Goal Outcome Evaluation:

## 2024-04-16 NOTE — PLAN OF CARE
Problem: Adult Inpatient Plan of Care  Goal: Plan of Care Review  Outcome: Ongoing, Progressing  Goal: Patient-Specific Goal (Individualized)  Outcome: Ongoing, Progressing  Goal: Absence of Hospital-Acquired Illness or Injury  Outcome: Ongoing, Progressing  Intervention: Identify and Manage Fall Risk  Intervention: Prevent and Manage VTE (Venous Thromboembolism) Risk  Intervention: Prevent Infection  Goal: Optimal Comfort and Wellbeing  Outcome: Ongoing, Progressing  Intervention: Monitor Pain and Promote Comfort  Intervention: Provide Person-Centered Care  Goal: Readiness for Transition of Care  Outcome: Ongoing, Progressing     Problem: Adjustment to Role Transition (Postpartum Vaginal Delivery)  Goal: Successful Maternal Role Transition  Outcome: Ongoing, Progressing  Intervention: Support Maternal Role Transition     Problem: Bleeding (Postpartum Vaginal Delivery)  Goal: Hemostasis  Outcome: Ongoing, Progressing     Problem: Infection (Postpartum Vaginal Delivery)  Goal: Absence of Infection Signs/Symptoms  Outcome: Ongoing, Progressing     Problem: Pain (Postpartum Vaginal Delivery)  Goal: Acceptable Pain Control  Outcome: Ongoing, Progressing  Intervention: Prevent or Manage Pain     Problem: Urinary Retention (Postpartum Vaginal Delivery)  Goal: Effective Urinary Elimination  Outcome: Ongoing, Progressing   Goal Outcome Evaluation:         Pt is up  ad adebayo caring for herself and infant. Vs ., pain and assessment wnl. Appropriate bonding.

## 2024-04-17 VITALS
TEMPERATURE: 97.3 F | DIASTOLIC BLOOD PRESSURE: 75 MMHG | WEIGHT: 199 LBS | OXYGEN SATURATION: 96 % | BODY MASS INDEX: 35.26 KG/M2 | HEART RATE: 96 BPM | HEIGHT: 63 IN | RESPIRATION RATE: 18 BRPM | SYSTOLIC BLOOD PRESSURE: 119 MMHG

## 2024-04-17 LAB
CYTO UR: NORMAL
LAB AP CASE REPORT: NORMAL
LAB AP CLINICAL INFORMATION: NORMAL
PATH REPORT.FINAL DX SPEC: NORMAL
PATH REPORT.GROSS SPEC: NORMAL

## 2024-04-17 PROCEDURE — 90707 MMR VACCINE SC: CPT | Performed by: STUDENT IN AN ORGANIZED HEALTH CARE EDUCATION/TRAINING PROGRAM

## 2024-04-17 PROCEDURE — 25010000002 MEASLES, MUMPS & RUBELLA VAC RECONSTITUTED SOLUTION: Performed by: STUDENT IN AN ORGANIZED HEALTH CARE EDUCATION/TRAINING PROGRAM

## 2024-04-17 PROCEDURE — 0503F POSTPARTUM CARE VISIT: CPT | Performed by: STUDENT IN AN ORGANIZED HEALTH CARE EDUCATION/TRAINING PROGRAM

## 2024-04-17 PROCEDURE — 90471 IMMUNIZATION ADMIN: CPT | Performed by: STUDENT IN AN ORGANIZED HEALTH CARE EDUCATION/TRAINING PROGRAM

## 2024-04-17 RX ORDER — IBUPROFEN 600 MG/1
600 TABLET ORAL EVERY 6 HOURS SCHEDULED
Qty: 28 TABLET | Refills: 0 | Status: SHIPPED | OUTPATIENT
Start: 2024-04-17 | End: 2024-04-24

## 2024-04-17 RX ORDER — ACETAMINOPHEN 500 MG
1000 TABLET ORAL EVERY 6 HOURS
Qty: 56 TABLET | Refills: 0 | Status: SHIPPED | OUTPATIENT
Start: 2024-04-17 | End: 2024-04-24

## 2024-04-17 RX ADMIN — VITAMIN A, VITAMIN C, VITAMIN D, VITAMIN E, THIAMINE, RIBOFLAVIN, NIACIN, VITAMIN B6, FOLIC ACID, VITAMIN B12, CALCIUM, IRON, ZINC, COPPER 1 TABLET: 4000; 120; 400; 22; 1.84; 3; 20; 10; 1; 12; 200; 27; 25; 2 TABLET ORAL at 08:05

## 2024-04-17 RX ADMIN — DOCUSATE SODIUM 100 MG: 100 CAPSULE, LIQUID FILLED ORAL at 08:05

## 2024-04-17 RX ADMIN — MEASLES, MUMPS, AND RUBELLA VIRUS VACCINE LIVE 0.5 ML: 1000; 12500; 1000 INJECTION, POWDER, LYOPHILIZED, FOR SUSPENSION SUBCUTANEOUS at 10:55

## 2024-04-17 NOTE — PROGRESS NOTES
"PostPartum/PostOp PROGRESS NOTE        Subjective:  Patient has no complaints  Pain controlled  Tolerating a regular diet  Passing flatus  Ambulating  Urinating spontaneously  Lochia decreasing, no bleeding concerns  Denies HA, vision change, or RUQ/epigastric pain  No lightheadedness or dizziness  Desires DC home if baby Dc'd    Objective:  Vitals: Blood pressure 115/78, pulse 86, temperature 97.7 °F (36.5 °C), temperature source Oral, resp. rate 16, height 160 cm (63\"), weight 90.3 kg (199 lb), last menstrual period 2023, SpO2 96%, currently breastfeeding.          General: NAD, alert and oriented, appropriate  Psych: Normal mood, appropriate  Abdomen: Fundus firm, non tender  Extremeties: Trace edema    Labs:  Lab Results (last 24 hours)       ** No results found for the last 24 hours. **              Assessment:    Post-partum/postop Day:  2  Status post   Elevated blood pressure without diagnosis of hypertension    Plan:   Routine postpartum/postop care  Contraception: Unsure, [email or web address] discussed  Breast-feeding:: Breast  Advance diet, Ambulate, Remove bandage, Shower, PO pain meds, Breast feeding support, Discharge home, DC meds reviewed, Follow up scheduled, PP/PO precautions given, HTN precautions reviewed in detail.  Questions answered.  RTO/ER for HA not relieved w tylenol, vision changes, epig/RUQ pain, or Bps elevated at home.  Overall doing well postpartum day #2.  Desires discharge home.  Maternal milestones met for discharge.  DC home today.    Electronically signed by Arsenio Saunders MD, 24, 9:50 AM EDT.    "

## 2024-04-17 NOTE — PLAN OF CARE
Goal Outcome Evaluation:   Ongoing, Progressing: FF, scant to light rubra without clots. Breastfeeding ongoing with supplementation requested per patient. Voiding well and passing flatus. Tolerating PO fluids and solids without difficulty.

## 2024-04-17 NOTE — PLAN OF CARE
Goal Outcome Evaluation:  Plan of Care Reviewed With: patient, spouse           Outcome Evaluation: VSS assess WNL's understands all discharge instructions.

## 2024-04-17 NOTE — LACTATION NOTE
LC in to follow up with breastfeeding progress. Infant is still struggling with latch to the left breast and bedside nurse used a nipple shield and this seemed effective and baby continued to suckle with mild stimulation. No swallows seen on this breast at this feeding. Baby is still supplementing with no formula after breastfeeding. LC stressed the need to pump if she is supplementing to induce good milk production and she is now using a nipple shield.  Patient is planning on discharge today. LC discussed normal infant output patterns to expect and unlimited time/access to the breast but if infant is not waking by 3 hours to wake and feed using measures shown in the hospital. LC discussed checking to make sure new medications are safe to breastfeed. LC discussed alcohol use and cigarette/second hand smoke around baby and breastfeeding and discussed the impact of street drugs on infants and breastfeeding. LC used the page in the breastfeeding guide to discuss harmful effects of these. Breastfeeding/Lactation expectations and anticipatory guidance discussed for the next two weeks . LC discussed nipple care, plugged ducts, engorgement, and breast infection. LC encouraged mom to see pediatrician two days from discharge for follow up. Patient has a breastpump for home use and LC discussed good pumping guidelines and normal expectations with pumping and storage and preparation of ebm for feedings. LC discussed breastfeeding/lactation resources including the local breastfeeding support group after discharge and when to call the doctor. Patient showed good understanding.  
23-Nov-2019 04:45

## 2024-04-20 ENCOUNTER — TELEPHONE (OUTPATIENT)
Dept: LACTATION | Facility: HOSPITAL | Age: 24
End: 2024-04-20
Payer: COMMERCIAL

## 2024-04-20 NOTE — TELEPHONE ENCOUNTER
PT states she is now exclusively pumping every 2 hours, she is getting 1.5-2oz. Baby is taking everything she is pumping. Baby was down to 10% wt loss but has started to gain, baby goes back to Pedi Monday for wt check. Pt has no questions or concerns, encouraged to contact LC as needed.

## 2024-04-26 ENCOUNTER — MATERNAL SCREENING (OUTPATIENT)
Dept: CALL CENTER | Facility: HOSPITAL | Age: 24
End: 2024-04-26
Payer: COMMERCIAL

## 2024-04-26 NOTE — OUTREACH NOTE
Maternal Screening Survey      Flowsheet Row Responses   Facility patient discharged from? [facility]   Attempt successful? Yes   Call start time 1100   Call end time 1102   EPD Scale: Able to Laugh 0-->as much as she always could   EPD Scale: Looked Forward 0-->as much as she ever did   EPD Scale: Blamed Self 2-->yes, some of the time   EPD Scale: Been Anxious 2-->yes, sometimes   EPD Scale: Felt Panicky 0-->no, not at all   EPD Scale: Things Getting on Top 0-->no, has been coping as well as ever   EPD Scale: Difficulty Sleeping 0-->no, not at all   EPD Scale: Sad or Miserable 0-->no, not at all   EPD Scale: Crying 0-->no, never   EPD Scale: Thought of Harming Self 0-->never   [place]  Depression Score 4   Did any of your parents have problems with alcohol or drug use? No   Do any of your peers have problems with alcohol or drug use? No   Does your partner have problems with alcohol or drug use? No   Before you were pregnant did you have problems with alcohol or drug use? (past) No   In the past month, did you drink beer, wine, liquor or use any other drugs? (pregnancy) No   Maternal Screening call completed Yes   Call end time 1102              Lizzy NORRIS - Registered Nurse

## 2024-05-01 ENCOUNTER — POSTPARTUM VISIT (OUTPATIENT)
Dept: OBSTETRICS AND GYNECOLOGY | Facility: CLINIC | Age: 24
End: 2024-05-01
Payer: COMMERCIAL

## 2024-05-01 VITALS — SYSTOLIC BLOOD PRESSURE: 124 MMHG | WEIGHT: 184 LBS | BODY MASS INDEX: 32.59 KG/M2 | DIASTOLIC BLOOD PRESSURE: 80 MMHG

## 2024-05-01 DIAGNOSIS — Z01.30 BP CHECK: Primary | ICD-10-CM

## 2024-05-01 NOTE — PROGRESS NOTES
POSTPARTUM Follow Up Visit    CC:  Postpartum - BP check     HPI:   Elisha Patten is a 24 y.o.  s/p  on 4/15/2024. Patient denies any headache, visual disturbances, new onset nausea vomiting, right upper quadrant pain, or new onset swelling. Not checking Bps at home.         Antepartum or Postpartum complications:   Patient Active Problem List   Diagnosis    Family history of diabetes mellitus    Family history of stroke    Allergic rhinitis    Supervision of other normal pregnancy, antepartum    Rubella nonimmune    PROM with onset of labor within 24 hours of rupture     (normal spontaneous vaginal delivery)       Delivery type:    Perineum : Intact, Vaginal midline laceration  Feeding: Pumping     Pain:  No  Vaginal Bleeding:  No  EPDS score: 4 (2024 12:19 PM)    Last PAP:   Last Completed Pap Smear       This patient has no relevant Health Maintenance data.            /80   Wt 83.5 kg (184 lb)   LMP 2023 (Exact Date)   Breastfeeding Yes Comment: Pumping  BMI 32.59 kg/m²     Physical Exam  Vitals and nursing note reviewed.   Constitutional:       General: She is not in acute distress.     Appearance: Normal appearance. She is not toxic-appearing.   HENT:      Head: Normocephalic and atraumatic.   Eyes:      Extraocular Movements: Extraocular movements intact.      Conjunctiva/sclera: Conjunctivae normal.   Cardiovascular:      Pulses: Normal pulses.   Pulmonary:      Effort: Pulmonary effort is normal.   Abdominal:      General: There is no distension.      Palpations: Abdomen is soft.      Tenderness: There is no abdominal tenderness.   Genitourinary:     Comments: deferred  Musculoskeletal:      Cervical back: Normal range of motion.   Skin:     General: Skin is warm and dry.   Neurological:      Mental Status: She is alert and oriented to person, place, and time.   Psychiatric:         Mood and Affect: Mood normal.         Behavior: Behavior normal.         Thought  Content: Thought content normal.           ASSESSMENT AND PLAN:  Elisha Patten is a 24 y.o.  [id number] for postpartum evaluation.  Patient now postpartum day number 16.  Patient has experienced a weight loss of 15 since delivery. Preeclampsia precautions.  RTO in 3 weeks   .  Diagnoses and all orders for this visit:    1. BP check (Primary)    2. Postpartum follow-up          Follow Up:  Return in about 3 weeks (around 2024) for postpartum follow up .          Arsenio Saunders MD  2024

## 2024-05-23 ENCOUNTER — POSTPARTUM VISIT (OUTPATIENT)
Dept: OBSTETRICS AND GYNECOLOGY | Facility: CLINIC | Age: 24
End: 2024-05-23
Payer: COMMERCIAL

## 2024-05-23 VITALS
WEIGHT: 189.2 LBS | HEIGHT: 63 IN | DIASTOLIC BLOOD PRESSURE: 86 MMHG | BODY MASS INDEX: 33.52 KG/M2 | HEART RATE: 99 BPM | SYSTOLIC BLOOD PRESSURE: 125 MMHG

## 2024-05-23 DIAGNOSIS — Z30.09 ENCOUNTER FOR COUNSELING REGARDING CONTRACEPTION: ICD-10-CM

## 2024-05-23 DIAGNOSIS — Z12.4 CERVICAL CANCER SCREENING: ICD-10-CM

## 2024-05-23 PROBLEM — B06.9: Status: RESOLVED | Noted: 2023-11-01 | Resolved: 2024-05-23

## 2024-05-23 PROBLEM — Z34.80 SUPERVISION OF OTHER NORMAL PREGNANCY, ANTEPARTUM: Status: RESOLVED | Noted: 2023-10-30 | Resolved: 2024-05-23

## 2024-05-23 PROBLEM — O42.00 PROM WITH ONSET OF LABOR WITHIN 24 HOURS OF RUPTURE: Status: RESOLVED | Noted: 2024-04-15 | Resolved: 2024-05-23

## 2024-05-23 PROBLEM — O98.519: Status: RESOLVED | Noted: 2023-11-01 | Resolved: 2024-05-23

## 2024-05-23 PROCEDURE — G0123 SCREEN CERV/VAG THIN LAYER: HCPCS | Performed by: STUDENT IN AN ORGANIZED HEALTH CARE EDUCATION/TRAINING PROGRAM

## 2024-05-23 NOTE — PROGRESS NOTES
"POSTPARTUM Follow Up Visit    CC:  Postpartum     HPI:   Elisha Patten is a 24 y.o.  status post normal spontaneous vaginal delivery on 4/15/2024. Patient denies any headache, visual disturbances, new onset nausea vomiting, right upper quadrant pain, or new onset swelling.        Antepartum or Postpartum complications:   Patient Active Problem List   Diagnosis    Family history of diabetes mellitus    Family history of stroke    Allergic rhinitis    Supervision of other normal pregnancy, antepartum    Rubella nonimmune    PROM with onset of labor within 24 hours of rupture     (normal spontaneous vaginal delivery)       Delivery type:    Perineum : Abrasions, Vaginal midline laceration  Feeding: Pumping     Pain:  No;  Vaginal Bleeding:  No; no urinary or bowel complaints. No irritation or odor.   EPDS score: 5 (2024 12:37 PM)  No SI or HI   Plans for BC:  IUD  Last PAP:   Last Completed Pap Smear       This patient has no relevant Health Maintenance data.            /86   Pulse 99   Ht 160 cm (63\")   Wt 85.8 kg (189 lb 3.2 oz)   LMP 2023 (Exact Date)   Breastfeeding Yes   BMI 33.52 kg/m²     Physical Exam  Vitals and nursing note reviewed. Exam conducted with a chaperone present.   Constitutional:       General: She is not in acute distress.     Appearance: Normal appearance. She is not toxic-appearing.   HENT:      Head: Normocephalic and atraumatic.   Eyes:      Extraocular Movements: Extraocular movements intact.      Conjunctiva/sclera: Conjunctivae normal.   Cardiovascular:      Pulses: Normal pulses.   Pulmonary:      Effort: Pulmonary effort is normal.   Abdominal:      General: There is no distension.      Palpations: Abdomen is soft.      Tenderness: There is no abdominal tenderness.   Genitourinary:     General: Normal vulva.      Exam position: Lithotomy position.      Labia:         Right: No tenderness, lesion or injury.         Left: No tenderness, lesion or " injury.       Cervix: Discharge present.      Uterus: Normal.       Adnexa: Right adnexa normal and left adnexa normal.      Comments: Well-healing vaginal laceration right vaginal midline.  Musculoskeletal:      Cervical back: Normal range of motion.   Skin:     General: Skin is warm and dry.   Neurological:      Mental Status: She is alert and oriented to person, place, and time.   Psychiatric:         Mood and Affect: Mood normal.         Behavior: Behavior normal.         Thought Content: Thought content normal.           ASSESSMENT AND PLAN:  Elisha Patten is a 24 y.o.  presenting for postpartum evaluation.  Patient now postpartum day number 38.  Patient has experienced a weight loss of 10 # since delivery.  Patient desires IUD, Mirena, for contraception.  Risk benefits and alternatives of contraception discussed with patient.  No absolute contraindications to contraception method.  Recommendation for evaluation with primary care physician within 3 to 6 months from delivery.  Return to office in 3 weeks for IUD insertion.  Beta quant 24 hours prior to insertion.  Strict pelvic rest recommended.  Patient need an updated Pap smear which was performed today.    Diagnoses and all orders for this visit:    1. Postpartum follow-up (Primary)    2. Encounter for counseling regarding contraception  -     hCG, Quantitative, Pregnancy; Future    3. Cervical cancer screening  -     IGP,rfx Aptima HPV All Pth        Counseling:  All birth control options reviewed in detail.  R/B/A/SE/E of each wrt pts PMHx and prior BC use  May resume normal activities  Core strengthening exercises reviewed and recommended  Kegel exercises reviewed and recommended  Ok to return to work/school      Follow Up:  Return in about 3 weeks (around 2024) for IUD insertion .        Arsenio Saunders MD  2024

## 2024-05-30 LAB
CONV .: NORMAL
CYTOLOGIST CVX/VAG CYTO: NORMAL
CYTOLOGY CVX/VAG DOC CYTO: NORMAL
CYTOLOGY CVX/VAG DOC THIN PREP: NORMAL
DX ICD CODE: NORMAL
Lab: NORMAL
OTHER STN SPEC: NORMAL
STAT OF ADQ CVX/VAG CYTO-IMP: NORMAL

## 2024-06-18 ENCOUNTER — LAB (OUTPATIENT)
Dept: OBSTETRICS AND GYNECOLOGY | Facility: CLINIC | Age: 24
End: 2024-06-18
Payer: COMMERCIAL

## 2024-06-18 DIAGNOSIS — Z30.09 ENCOUNTER FOR COUNSELING REGARDING CONTRACEPTION: ICD-10-CM

## 2024-06-18 PROCEDURE — 36415 COLL VENOUS BLD VENIPUNCTURE: CPT | Performed by: STUDENT IN AN ORGANIZED HEALTH CARE EDUCATION/TRAINING PROGRAM

## 2024-06-18 PROCEDURE — 84702 CHORIONIC GONADOTROPIN TEST: CPT | Performed by: STUDENT IN AN ORGANIZED HEALTH CARE EDUCATION/TRAINING PROGRAM

## 2024-06-19 ENCOUNTER — PROCEDURE VISIT (OUTPATIENT)
Dept: OBSTETRICS AND GYNECOLOGY | Facility: CLINIC | Age: 24
End: 2024-06-19
Payer: COMMERCIAL

## 2024-06-19 VITALS
HEART RATE: 92 BPM | DIASTOLIC BLOOD PRESSURE: 74 MMHG | BODY MASS INDEX: 33.13 KG/M2 | HEIGHT: 63 IN | SYSTOLIC BLOOD PRESSURE: 114 MMHG | WEIGHT: 187 LBS

## 2024-06-19 DIAGNOSIS — Z30.430 ENCOUNTER FOR IUD INSERTION: Primary | ICD-10-CM

## 2024-06-19 LAB
B-HCG UR QL: NEGATIVE
EXPIRATION DATE: NORMAL
HCG INTACT+B SERPL-ACNC: <1 MIU/ML
INTERNAL NEGATIVE CONTROL: NORMAL
INTERNAL POSITIVE CONTROL: NORMAL
Lab: NORMAL

## 2024-06-19 NOTE — PROGRESS NOTES
"IUD Placement    Chief Complaint   Patient presents with    Contraception       Sex in last 2 weeks:  No  [place]  [facility] [place] [facility] [place]  Consent signed: yes  Last pap smear: NILM      Procedure was explained in detail.  She understands the potential risks include, but are not limited to, failure (pregnancy), pain, bleeding, uterine perforation, and infection.  Her questions have been answered.      Subjective:  Elisha Patten is a 24 y.o.  presenting for contraception.  No other acute complaints.      Objective:  /74   Pulse 92   Ht 160 cm (63\")   Wt 84.8 kg (187 lb)   LMP 2024 (Exact Date)   Breastfeeding Yes Comment: PUMPING  BMI 33.13 kg/m²   General- NAD, alert and oriented, appropriate  Psych- Normal mood, good memory  Abdomen- Soft, non distended, non tender, no masses  External genitalia- Normal, no lesions  Vagina- Normal, no discharge  Cervix- Normal without lesion or discharge.   Uterus- Normal size, shape & consistency.  Non tender, mobile.. Uterus sounded to 8cm.      Betadine/Hibiclens x3.  Tenaculum placed on cervix.  Mirena IUD placed without difficulty.    Strings cut 2 1/2cm.      Patient tolerated the procedure well. Chaperone present during pelvic exam.    Assessment and Plan:  Diagnoses and all orders for this visit:    1. Encounter for IUD insertion (Primary)  -     POC Pregnancy, Urine  -     Levonorgestrel (MIRENA) 20 MCG/DAY IUD intrauterine device 1 each        Counseling:  She was counseled on the use of the IUD.  It provides contraception for 8years (Mirena), 5years (Kyleena/Liletta) or 3 years (Allie) or 10 years (Copper).  Failure is <1%.  It does not protect her from STDs and condoms are recommended.  She has been instructed to check the strings monthly.       PRECAUTIONS-- If she has any fevers >101.5F, and abdominal/pelvic pain, or bleeding > 1pad/2hours, she needs to call our office or on call/ER (after hours/weekend).  She understands the " potential risk of pelvic inflammatory disease and the potential for an effect on her future fertility if she does not seek immediate evaluation.  Cramping due to the IUD should be controlled with a OTC reliever/NSAID.  If not, she should call our office.  If she misses a period and has a positive pregnancy test she must immediately seek medical attention due to the risk of ectopic pregnancy which can be life threatening.  She understands removal can sometimes be difficult and can require surgery.    TRACK MENSES, RTO if <q21 days (frequent) or >q3mo (infrequent IF not on hormonal BC), >7d long, heavy, or painful.    Follow Up:  Return in about 3 months (around 9/19/2024) for IUD string check.        Arsenio Saunders MD  06/19/2024    [facility] [place] [facility]  [address] [place] [address]  Dept: [phone]  Dept Fax: [phone]  Loc: [phone]  Loc Fax: [phone]

## 2024-06-20 ENCOUNTER — TELEPHONE (OUTPATIENT)
Dept: OBSTETRICS AND GYNECOLOGY | Facility: CLINIC | Age: 24
End: 2024-06-20
Payer: COMMERCIAL

## 2024-06-20 NOTE — TELEPHONE ENCOUNTER
6/20/2024 [facility] FOR PATIENT TO RETURN CALL TO SCHEDULE FOLLOW UP APPT (Return in about 3 months (around 9/19/2024) for IUD string check).  PATIENT TO BE SCHEDULE WITH AN APRN FOR APPT.  6/21/2024 PATIENT HAS BEEN SCHEDULED WITH BO OLIVIA ON 9/20/24 @ 11:15 AM

## 2024-08-02 ENCOUNTER — TELEPHONE (OUTPATIENT)
Dept: OBSTETRICS AND GYNECOLOGY | Facility: CLINIC | Age: 24
End: 2024-08-02
Payer: COMMERCIAL

## 2024-08-02 NOTE — TELEPHONE ENCOUNTER
"Patient called with concerns of mastitis. She staretd yesterday morning with increasing left breast tenderness with redness and warmth noted. She denies fever/chills but feels \"run down\". She is pumping exclusively. She is requesting treatment sent to [facility]. Please advise.   "

## 2024-08-02 NOTE — TELEPHONE ENCOUNTER
Monitor for development of fevers and chills; use warm compress and express milk towards nipple. If no improvement please have patient reach out to lactation which can see patient in hospital for evaluation for need of antibiotics.

## 2024-08-04 ENCOUNTER — TELEPHONE (OUTPATIENT)
Dept: LACTATION | Facility: HOSPITAL | Age: 24
End: 2024-08-04
Payer: COMMERCIAL

## 2024-08-04 NOTE — TELEPHONE ENCOUNTER
LC returned call to pt that left message, pt with concern for mastitis in left breast, had called [facility] office yesterday and they had told her to call LC office. Pt reports, reddened area on left breast with hard area, no fever or chills, did feel poorly Thursday evening like she was going to get sick. Has been alternating Motrin and Tylenol as per Ob office instruction. She denies decrease in supply, does states left breast is painful compared to right side. LC encouraged ice not heat to area, pumping as normal, continue taking the motrin for anti inflammation and if symptoms get worse or she gets a fever to go to urgent care or ER for treatment. 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 Told her to swing by LC office so that LC could evaluate in am. Pt verb understanding.

## 2024-09-20 ENCOUNTER — OFFICE VISIT (OUTPATIENT)
Dept: OBSTETRICS AND GYNECOLOGY | Facility: CLINIC | Age: 24
End: 2024-09-20
Payer: COMMERCIAL

## 2024-09-20 VITALS
SYSTOLIC BLOOD PRESSURE: 127 MMHG | HEIGHT: 63 IN | WEIGHT: 182 LBS | BODY MASS INDEX: 32.25 KG/M2 | HEART RATE: 93 BPM | DIASTOLIC BLOOD PRESSURE: 84 MMHG

## 2024-09-20 DIAGNOSIS — Z30.431 IUD CHECK UP: Primary | ICD-10-CM

## 2024-12-11 ENCOUNTER — TELEMEDICINE (OUTPATIENT)
Dept: FAMILY MEDICINE CLINIC | Facility: TELEHEALTH | Age: 24
End: 2024-12-11
Payer: COMMERCIAL

## 2024-12-11 VITALS — TEMPERATURE: 98 F | HEART RATE: 107 BPM

## 2024-12-11 DIAGNOSIS — U07.1 COVID-19: ICD-10-CM

## 2024-12-11 DIAGNOSIS — J02.9 ACUTE PHARYNGITIS, UNSPECIFIED ETIOLOGY: Primary | ICD-10-CM

## 2024-12-11 PROCEDURE — 99213 OFFICE O/P EST LOW 20 MIN: CPT | Performed by: NURSE PRACTITIONER

## 2024-12-11 NOTE — PROGRESS NOTES
CHIEF COMPLAINT  Chief Complaint   Patient presents with    Sore Throat    Headache    Cough         HPI  Elisha Patten is a 24 y.o. female  presents with complaint of sore throat yesterday and then developed a cough and HA today.   The Excedrin Migraine helped her HA.   COVID-19 exposure at work.      Review of Systems   Constitutional:  Positive for fatigue (with HA.). Negative for chills, diaphoresis and fever.   HENT:  Positive for sore throat. Negative for congestion, postnasal drip, rhinorrhea, sinus pain and sneezing.    Respiratory:  Positive for cough. Negative for chest tightness, shortness of breath and wheezing.    Cardiovascular:  Negative for chest pain.   Gastrointestinal:  Negative for diarrhea, nausea and vomiting.   Musculoskeletal:  Negative for myalgias.   Neurological:  Positive for headaches.   Hematological:  Negative for adenopathy.       Past Medical History:   Diagnosis Date    Seasonal allergies        Family History   Problem Relation Age of Onset    Hyperlipidemia Father     Diabetes Mother         DMT2    No Known Problems Brother     No Known Problems Sister     No Known Problems Son     No Known Problems Daughter     No Known Problems Paternal Grandfather     No Known Problems Paternal Grandmother     Hyperlipidemia Maternal Grandmother     Hyperlipidemia Maternal Grandfather     Diabetes Paternal Uncle         DMT2    Diabetes Paternal Uncle         DMT1    No Known Problems Cousin     No Known Problems Other     Rheum arthritis Neg Hx     Osteoarthritis Neg Hx     Asthma Neg Hx     Heart failure Neg Hx     Hypertension Neg Hx     Migraines Neg Hx     Rashes / Skin problems Neg Hx     Seizures Neg Hx     Stroke Neg Hx     Thyroid disease Neg Hx     Breast cancer Neg Hx     Uterine cancer Neg Hx     Ovarian cancer Neg Hx     Colon cancer Neg Hx        Social History     Socioeconomic History    Marital status:    Tobacco Use    Smoking status: Never     Passive exposure:  Never    Smokeless tobacco: Never   Vaping Use    Vaping status: Never Used   Substance and Sexual Activity    Alcohol use: Not Currently    Drug use: Never    Sexual activity: Yes     Partners: Male     Birth control/protection: I.U.D.     Comment: 6/19/24 MIRJUNE         Pulse 107   Temp 98 °F (36.7 °C)   Breastfeeding No     PHYSICAL EXAM  Physical Exam   Constitutional: She is oriented to person, place, and time. She appears well-developed and well-nourished. She does not have a sickly appearance. She does not appear ill. No distress.   HENT:   Head: Normocephalic and atraumatic.   Eyes: EOM are normal.   Neck: Neck normal appearance.  Pulmonary/Chest: Effort normal.  No respiratory distress.  Lymphadenopathy:     She has no cervical adenopathy (per patient).   Neurological: She is alert and oriented to person, place, and time.   Skin: Skin is dry.   Psychiatric: She has a normal mood and affect.           Diagnoses and all orders for this visit:    1. Acute pharyngitis, unspecified etiology (Primary)  -     POC Strep A, PCR (Roche Mariela); Future    2. COVID-19  -     MARIELA FLU + SARS PCR; Future    Called patient to inform her of test results. She is positive for COVID-19.   Discussed:   This is likely a viral illness. Viral illnesses do not respond to antibiotics. It is best to treat viruses with rest and drinking plenty of fluids. Over the counter medications can help ease symptoms.      Mode of visit: Video   Myself and Elisha Patten participated in this visit. The patient is located in [place], [place] . I am located in [place], [place]. Mychart and Tyto were utilized.   You have chosen to receive care through a telehealth visit.    You have chosen to receive care through a telehealth visit.   Does the patient consent to use a video/audio connection for your medical care today? Yes       Note Disclaimer: At [facility], we believe that sharing information builds trust and better    relationships. You are receiving this note because you recently visited [facility]. It is possible you   will see health information before a provider has talked with you about it. This kind of information can   be easy to misunderstand. To help you fully understand what it means for your health, we urge you to   discuss this note with your provider.    Christie Patrick, MAURY  12/11/2024  18:57 EST

## 2024-12-12 NOTE — PATIENT INSTRUCTIONS
Drink plenty of water  Over the counter pain relievers okay   If symptoms do not improve in 3-5 days follow up with your primary care provider or urgent care  If symptoms worsen follow up with urgent care or the emergency room      Diagnosis  COVID-19 infection  Most people with COVID-19 have mild to moderate symptoms and can rest at home until they get better.   Stay home  While you're recovering, STAY HOME for at least 5 full days. Don't leave your home except to get medical care.  While at home, avoid close contact with others.  If possible, stay in a room away from other people in your home, and use a separate bathroom.  Wear a well-fitting face mask when you can't avoid contact with other people.  If you can't wear a face mask because of breathing difficulty, your caregiver should wear a face mask.  Wearing a face mask does NOT mean you can leave your home. You must continue to stay home for at least 5 full days.  You can return to your normal activities when ALL of the following are true:  You've been fever-free for at least 24 hours (1 full day) without using fever-reducing medications such as Tylenol  Your symptoms have improved  It's been at least 5 full days since your symptoms first started  You should continue to wear a mask around others until it's been at least 10 days since your symptoms first started.  Prevention  Cover your mouth and nose with a tissue when you cough or sneeze. Throw used tissues in a lined trash can right away, and wash your hands immediately after.  Avoid sharing personal items like dishes, utensils, towels, or bedding. Wash items thoroughly with soap and water after use.  Wash your hands often with soap and water for at least 20 seconds. If soap and water are not available, clean your hands with a hand  that contains at least 60% alcohol. Cover all surfaces of your hands and rub them together until they feel dry.  Avoid touching your face, especially your eyes, nose, and  "mouth.  Clean \"high-touch\" surfaces daily. \"High-touch\" surfaces include counters, tabletops, doorknobs, bathroom fixtures, toilets, phones, keyboards, tablets, and bedside tables. You can use soap, detergents, 60%-80% ethanol or isopropyl alcohol,  such as Windex, or bleach. All of these  are effective at killing the virus that causes COVID-19.  Limit contact with pets and other animals while sick. If you must care for your pet, wash your hands before and after you interact with them and wear a face mask.  What to expect  Follow the advice in the treatment section below and you should feel better within 7 to 14 days. You may continue to feel tired and have a cough for several weeks.    About your diagnosis  Common symptoms of COVID-19 include fever, cough, shortness of breath, fatigue, muscle or body aches, headaches, new loss of sense of taste or smell, sore throat, stuffy or runny nose, nausea or vomiting, and diarrhea. Most people who get COVID-19 have mild symptoms and can rest at home until they get better. Elderly people and those with chronic medical problems may be at risk for more serious complications.    Call your healthcare provider immediately if you have any of the following:  Fever over 103F  Fever that doesn't come down when you take Tylenol or ibuprofen  Fever that returns after being gone for more than 24 hours  Fever for more than 4 days  Worsening shortness of breath or difficulty breathing  Go to your nearest ER or call 911 if you have any of the following:  Shortness of breath that makes it difficult to do simple things like get dressed, bathe, or comb your hair  Persistent chest pain or chest tightness  New confusion or difficulty staying alert  Bluish color to the lips or face  Other treatment  Rest and drink plenty of sugar-free fluids.  Gargle with salt water several times a day to help your throat feel better. Cough drops and throat lozenges may provide extra relief. " A teaspoon of honey stirred into warm water or weak tea can help soothe a sore throat and cough.  If your nose or sinuses become very stuffy, try using a Neti Pot to flush them out. Neti Pots are available at any drugstore without a prescription.  Avoid smoke and air pollution. Smoke can make infections worse.      This is likely a viral illness. Viral illnesses do not respond to antibiotics. It is best to treat viruses with rest and drinking plenty of fluids. Over the counter medications can help ease symptoms.       Upper Respiratory Infection, Adult  An upper respiratory infection (URI) is a common viral infection of the nose, throat, and upper air passages that lead to the lungs. The most common type of URI is the common cold. URIs usually get better on their own, without medical treatment.  What are the causes?  A URI is caused by a virus. You may catch a virus by:  Breathing in droplets from an infected person's cough or sneeze.  Touching something that has been exposed to the virus (is contaminated) and then touching your mouth, nose, or eyes.  What increases the risk?  You are more likely to get a URI if:  You are very young or very old.  You have close contact with others, such as at work, school, or a health care facility.  You smoke.  You have long-term (chronic) heart or lung disease.  You have a weakened disease-fighting system (immune system).  You have nasal allergies or asthma.  You are experiencing a lot of stress.  You have poor nutrition.  What are the signs or symptoms?  A URI usually involves some of the following symptoms:  Runny or stuffy (congested) nose.  Cough.  Sneezing.  Sore throat.  Headache.  Fatigue.  Fever.  Loss of appetite.  Pain in your forehead, behind your eyes, and over your cheekbones (sinus pain).  Muscle aches.  Redness or irritation of the eyes.  Pressure in the ears or face.  How is this diagnosed?  This condition may be diagnosed based on your medical history and  symptoms, and a physical exam. Your health care provider may use a swab to take a mucus sample from your nose (nasal swab). This sample can be tested to determine what virus is causing the illness.  How is this treated?  URIs usually get better on their own within 7-10 days. Medicines cannot cure URIs, but your health care provider may recommend certain medicines to help relieve symptoms, such as:  Over-the-counter cold medicines.  Cough suppressants. Coughing is a type of defense against infection that helps to clear the respiratory system, so take these medicines only as recommended by your health care provider.  Fever-reducing medicines.  Follow these instructions at home:  Activity  Rest as needed.  If you have a fever, stay home from work or school until your fever is gone or until your health care provider says your URI cannot spread to other people (is no longer contagious). Your health care provider may have you wear a face mask to prevent your infection from spreading.  Relieving symptoms  Gargle with a mixture of salt and water 3-4 times a day or as needed. To make salt water, completely dissolve ½-1 tsp (3-6 g) of salt in 1 cup (237 mL) of warm water.  Use a cool-mist humidifier to add moisture to the air. This can help you breathe more easily.  Eating and drinking    Drink enough fluid to keep your urine pale yellow.  Eat soups and other clear broths.  General instructions    Take over-the-counter and prescription medicines only as told by your health care provider. These include cold medicines, fever reducers, and cough suppressants.  Do not use any products that contain nicotine or tobacco. These products include cigarettes, chewing tobacco, and vaping devices, such as e-cigarettes. If you need help quitting, ask your health care provider.  Stay away from secondhand smoke.  Stay up to date on all immunizations, including the yearly (annual) flu vaccine.  Keep all follow-up visits. This is  important.  How to prevent the spread of infection to others  URIs can be contagious. To prevent the infection from spreading:  Wash your hands with soap and water for at least 20 seconds. If soap and water are not available, use hand .  Avoid touching your mouth, face, eyes, or nose.  Cough or sneeze into a tissue or your sleeve or elbow instead of into your hand or into the air.    Contact a health care provider if:  You are getting worse instead of better.  You have a fever or chills.  Your mucus is brown or red.  You have yellow or brown discharge coming from your nose.  You have pain in your face, especially when you bend forward.  You have swollen neck glands.  You have pain while swallowing.  You have white areas in the back of your throat.  Get help right away if:  You have shortness of breath that gets worse.  You have severe or persistent:  Headache.  Ear pain.  Sinus pain.  Chest pain.  You have chronic lung disease along with any of the following:  Making high-pitched whistling sounds when you breathe, most often when you breathe out (wheezing).  Prolonged cough (more than 14 days).  Coughing up blood.  A change in your usual mucus.  You have a stiff neck.  You have changes in your:  Vision.  Hearing.  Thinking.  Mood.  These symptoms may be an emergency. Get help right away. Call 911.  Do not wait to see if the symptoms will go away.  Do not drive yourself to the hospital.  Summary  An upper respiratory infection (URI) is a common infection of the nose, throat, and upper air passages that lead to the lungs.  A URI is caused by a virus.  URIs usually get better on their own within 7-10 days.  Medicines cannot cure URIs, but your health care provider may recommend certain medicines to help relieve symptoms.  This information is not intended to replace advice given to you by your health care provider. Make sure you discuss any questions you have with your health care provider.  Document Revised:  07/20/2022 Document Reviewed: 07/20/2022  Elsevier Patient Education © 2024 [employer]       Pharyngitis  Pharyngitis is a sore throat (pharynx). This is when there is redness, pain, and swelling in your throat. Most of the time, this condition gets better on its own. In some cases, you may need medicine.  What are the causes?  An infection from a virus.  An infection from bacteria.  Allergies.  What increases the risk?  Being 5-24 years old.  Being in crowded environments. These include:  Daycares.  Schools.  Dormitories.  Living in a place with cold temperatures outside.  Having a weakened disease-fighting (immune) system.  What are the signs or symptoms?  Symptoms may vary depending on the cause. Common symptoms include:  Sore throat.  Tiredness (fatigue).  Low-grade fever.  Stuffy nose.  Cough.  Headache.  Other symptoms may include:  Glands in the neck (lymph nodes) that are swollen.  Skin rashes.  Film on the throat or tonsils. This can be caused by an infection from bacteria.  Vomiting.  Red, itchy eyes.  Loss of appetite.  Joint pain and muscle aches.  Tonsils that are temporarily bigger than usual (enlarged).  How is this treated?  Many times, treatment is not needed. This condition usually gets better in 3-4 days without treatment.  If the infection is caused by a bacteria, you may be need to take antibiotics.  Follow these instructions at home:  Medicines  Take over-the-counter and prescription medicines only as told by your doctor.  If you were prescribed an antibiotic medicine, take it as told by your doctor. Do not stop taking the antibiotic even if you start to feel better.  Use throat lozenges or sprays to soothe your throat as told by your doctor.  Children can get pharyngitis. Do not give your child aspirin.  Managing pain  To help with pain, try:  Sipping warm liquids, such as:  Broth.  Herbal tea.  Warm water.  Eating or drinking cold or frozen liquids, such as frozen ice pops.  Rinsing your  mouth (gargle) with a salt water mixture 3-4 times a day or as needed.  To make salt water, dissolve ½-1 tsp (3-6 g) of salt in 1 cup (237 mL) of warm water.  Do not swallow this mixture.  Sucking on hard candy or throat lozenges.  Putting a cool-mist humidifier in your bedroom at night to moisten the air.  Sitting in the bathroom with the door closed for 5-10 minutes while you run hot water in the shower.     General instructions  Do not smoke or use any products that contain nicotine or tobacco. If you need help quitting, ask your doctor.  Rest as told by your doctor.  Drink enough fluid to keep your pee (urine) pale yellow.  How is this prevented?  Wash your hands often for at least 20 seconds with soap and water. If soap and water are not available, use hand .  Do not touch your eyes, nose, or mouth with unwashed hands. Wash hands after touching these areas.  Do not share cups or eating utensils.  Avoid close contact with people who are sick.  Contact a doctor if:  You have large, tender lumps in your neck.  You have a rash.  You cough up green, yellow-brown, or bloody spit.  Get help right away if:  You have a stiff neck.  You drool or cannot swallow liquids.  You cannot drink or take medicines without vomiting.  You have very bad pain that does not go away with medicine.  You have problems breathing, and it is not from a stuffy nose.  You have new pain and swelling in your knees, ankles, wrists, or elbows.  These symptoms may be an emergency. Get help right away. Call your local emergency services (911 in the [place]  Do not wait to see if the symptoms will go away.  Do not drive yourself to the hospital.  Summary  Pharyngitis is a sore throat (pharynx). This is when there is redness, pain, and swelling in your throat.  Most of the time, pharyngitis gets better on its own. Sometimes, you may need medicine.  If you were prescribed an antibiotic medicine, take it as told by your doctor. Do not stop  taking the antibiotic even if you start to feel better.  This information is not intended to replace advice given to you by your health care provider. Make sure you discuss any questions you have with your health care provider.  Document Revised: 03/16/2022 Document Reviewed: 03/16/2022  Elsevier Patient Education © 2024 [employer]

## 2025-01-07 ENCOUNTER — HOSPITAL ENCOUNTER (EMERGENCY)
Facility: HOSPITAL | Age: 25
Discharge: HOME | End: 2025-01-08
Attending: EMERGENCY MEDICINE
Payer: COMMERCIAL

## 2025-01-07 ENCOUNTER — APPOINTMENT (OUTPATIENT)
Dept: GENERAL RADIOLOGY | Facility: HOSPITAL | Age: 25
End: 2025-01-07
Payer: COMMERCIAL

## 2025-01-07 VITALS
SYSTOLIC BLOOD PRESSURE: 111 MMHG | WEIGHT: 180 LBS | HEIGHT: 63 IN | TEMPERATURE: 100.1 F | OXYGEN SATURATION: 97 % | DIASTOLIC BLOOD PRESSURE: 88 MMHG | RESPIRATION RATE: 20 BRPM | HEART RATE: 98 BPM | BODY MASS INDEX: 31.89 KG/M2

## 2025-01-07 DIAGNOSIS — S82.51XA CLOSED DISPLACED FRACTURE OF MEDIAL MALLEOLUS OF RIGHT TIBIA, INITIAL ENCOUNTER: ICD-10-CM

## 2025-01-07 DIAGNOSIS — S82.431A CLOSED DISPLACED OBLIQUE FRACTURE OF SHAFT OF RIGHT FIBULA, INITIAL ENCOUNTER: Primary | ICD-10-CM

## 2025-01-07 PROCEDURE — 99283 EMERGENCY DEPT VISIT LOW MDM: CPT

## 2025-01-07 PROCEDURE — 73610 X-RAY EXAM OF ANKLE: CPT

## 2025-01-07 RX ORDER — HYDROCODONE BITARTRATE AND ACETAMINOPHEN 5; 325 MG/1; MG/1
1 TABLET ORAL ONCE
Status: COMPLETED | OUTPATIENT
Start: 2025-01-08 | End: 2025-01-07

## 2025-01-07 RX ORDER — HYDROCODONE BITARTRATE AND ACETAMINOPHEN 5; 325 MG/1; MG/1
1 TABLET ORAL 4 TIMES DAILY PRN
Qty: 12 TABLET | Refills: 0 | Status: SHIPPED | OUTPATIENT
Start: 2025-01-07 | End: 2025-01-15 | Stop reason: HOSPADM

## 2025-01-07 RX ADMIN — HYDROCODONE BITARTRATE AND ACETAMINOPHEN 1 TABLET: 5; 325 TABLET ORAL at 23:42

## 2025-01-07 NOTE — Clinical Note
[facility] EMERGENCY ROOM  [address] [place] [address]  Phone: [phone]  Fax: [phone]    Elisha Patten was seen and treated in our emergency department on 1/7/2025.  She may return to work on 01/11/2025.         Thank you for choosing [facility].    Emily Martinez APRN

## 2025-01-07 NOTE — Clinical Note
[facility] EMERGENCY ROOM  [address] [place] [address]  Phone: [phone]  Fax: [phone]    Elisha Patten was seen and treated in our emergency department on 1/7/2025.  She may return to work on 01/11/2025.         Thank you for choosing [facility].    Emily Martinez APRN

## 2025-01-08 ENCOUNTER — TELEPHONE (OUTPATIENT)
Dept: ORTHOPEDIC SURGERY | Facility: CLINIC | Age: 25
End: 2025-01-08
Payer: COMMERCIAL

## 2025-01-08 NOTE — ED PROVIDER NOTES
Time: 10:26 PM EST  Date of encounter:  1/7/2025  Independent Historian/Clinical History and Information was obtained by:   Patient    History is limited by: N/A    Chief Complaint   Patient presents with    Ankle Injury     Right    Head Injury         History of Present Illness:  Patient is a 24 y.o. year old female who presents to the emergency department for evaluation of right ankle pain and head trauma. States that she slipped on ice while trying to get out of the vehicle. She then hit the back of her head on the running board and twisted her right ankle. She feels like it is broken. She has not been able to put weight on this. She also has a knot at the back of her head. Denies any LOC or vomiting.  No visual disturbance.  No dizziness denies any blood thinners. (Triage Provider: Nikolas Zheng PA-C).      Patient Care Team  Primary Care Provider: Lela Mckee MD    Past Medical History:     No Known Allergies  Past Medical History:   Diagnosis Date    Seasonal allergies      History reviewed. No pertinent surgical history.  Family History   Problem Relation Age of Onset    Hyperlipidemia Father     Diabetes Mother         DMT2    No Known Problems Brother     No Known Problems Sister     No Known Problems Son     No Known Problems Daughter     No Known Problems Paternal Grandfather     No Known Problems Paternal Grandmother     Hyperlipidemia Maternal Grandmother     Hyperlipidemia Maternal Grandfather     Diabetes Paternal Uncle         DMT2    Diabetes Paternal Uncle         DMT1    No Known Problems Cousin     No Known Problems Other     Rheum arthritis Neg Hx     Osteoarthritis Neg Hx     Asthma Neg Hx     Heart failure Neg Hx     Hypertension Neg Hx     Migraines Neg Hx     Rashes / Skin problems Neg Hx     Seizures Neg Hx     Stroke Neg Hx     Thyroid disease Neg Hx     Breast cancer Neg Hx     Uterine cancer Neg Hx     Ovarian cancer Neg Hx     Colon cancer Neg Hx        Home  "Medications:  Prior to Admission medications    Medication Sig Start Date End Date Taking? Authorizing Provider   loratadine (Claritin) 10 MG tablet Take 1 tablet by mouth Daily.    Provider, [place], MD        Social History:   Social History     Tobacco Use    Smoking status: Never     Passive exposure: Never    Smokeless tobacco: Never   Vaping Use    Vaping status: Never Used   Substance Use Topics    Alcohol use: Not Currently    Drug use: Never         Review of Systems:  Review of Systems   Cardiovascular:  Negative for chest pain.   Gastrointestinal:  Negative for abdominal pain.   Genitourinary:  Negative for flank pain.   Musculoskeletal:  Positive for arthralgias (Right ankle), gait problem and joint swelling. Negative for back pain and neck pain.   Skin:  Negative for wound.   Neurological:  Positive for headaches. Negative for weakness and numbness.   All other systems reviewed and are negative.       Physical Exam:  /88 (BP Location: Right arm, Patient Position: Sitting)   Pulse 98   Temp 100.1 °F (37.8 °C) (Oral)   Resp 20   Ht 160 cm (62.99\")   Wt 81.6 kg (180 lb)   SpO2 97%   BMI 31.89 kg/m²         Physical Exam  Vitals and nursing note reviewed.   HENT:      Head: Atraumatic.      Nose: Nose normal.   Eyes:      Conjunctiva/sclera: Conjunctivae normal.   Cardiovascular:      Rate and Rhythm: Normal rate and regular rhythm.      Pulses: Normal pulses.      Heart sounds: Normal heart sounds.   Pulmonary:      Effort: Pulmonary effort is normal.      Breath sounds: Normal breath sounds.   Chest:      Chest wall: No tenderness.   Abdominal:      Tenderness: There is no abdominal tenderness.   Musculoskeletal:         General: Swelling (Generalized right ankle) and tenderness (Diffuse right ankle tenderness all the way up lower right leg to just below knee) present.      Cervical back: Normal range of motion. No tenderness.      Comments: Severely Limited range of motion right foot and " ankle due to pain   Skin:     General: Skin is warm and dry.      Capillary Refill: Capillary refill takes less than 2 seconds.      Findings: Bruising (Slight bruising right medial ankle) present. No erythema.   Neurological:      General: No focal deficit present.      Mental Status: She is alert.      Sensory: No sensory deficit.      Motor: No weakness.   Psychiatric:         Mood and Affect: Mood normal.         Behavior: Behavior normal.            Medical Decision Making:      Comorbidities that affect care:    None    External Notes reviewed:    Previous Clinic Note: Patient seen at urgent care on December 11 for pharyngitis and URI      The following orders were placed and all results were independently analyzed by me:  Orders Placed This Encounter   Procedures    XR Ankle 3+ View Right    Ambulatory Referral to Orthopedic Surgery    Obtain & Apply The Following- Upper extremity; (posterior leg and stirrup)       Medications Given in the Emergency Department:  Medications   HYDROcodone-acetaminophen (NORCO) 5-325 MG per tablet 1 tablet (1 tablet Oral Given 1/7/25 3442)        ED Course:    The patient was initially evaluated in the triage area where orders were placed. The patient was later dispositioned by MAURY Howe.      The patient was advised to stay for completion of workup which includes but is not limited to communication of labs and radiological results, reassessment and plan. The patient was advised that leaving prior to disposition by a provider could result in critical findings that are not communicated to the patient.     ED Course as of 01/08/25 0609   Tue Jan 07, 2025 2227 PROVIDER IN TRIAGE  Patient was evaluated by Nikolas quesada PA-C. Orders were placed and awaiting final results and disposition.   [MV]   2345 XR Ankle 3+ View Right [DS]      ED Course User Index  [DS] Emily Martinez APRN  [MV] Nikolas Zheng PA       Labs:    Lab Results (last 24 hours)       ** No results found  for the last 24 hours. **             Imaging:    XR Ankle 3+ View Right    Result Date: 1/7/2025  XR ANKLE 3+ VW RIGHT Date of Exam: 1/7/2025 10:47 PM EST Indication: Right ankle pain after fall Comparison: None available. Findings: There is a mildly displaced fracture through the medial malleolus. There is a mildly displaced obliquely oriented fracture through the distal fibular shaft approximately 9 cm proximal to the distal tip. The ankle mortise remains in overall normal alignment. There is soft tissue swelling surrounding the ankle, predominantly along the anterior and medial aspect. The tibiotalar and subtalar joints are normal alignment.     Impression: 1.Mildly displaced fracture through the medial malleolus. 2.Mildly displaced obliquely oriented fracture through the distal fibular shaft. 3.Significant soft tissue swelling surrounding the ankle. Electronically Signed: Franklyn Vargas  1/7/2025 10:54 PM EST  Workstation ID: [id number]       Differential Diagnosis and Discussion:      Extremity Pain: Differential diagnosis includes but is not limited to soft tissue sprain, tendonitis, tendon injury, dislocation, fracture, deep vein thrombosis, arterial insufficiency, osteoarthritis, bursitis, and ligamentous damage.    PROCEDURES:    X-ray were performed in the emergency department and all X-ray impressions were independently interpreted by me.    No orders to display        Procedures    MDM  Number of Diagnoses or Management Options  Closed displaced fracture of medial malleolus of right tibia, initial encounter  Closed displaced oblique fracture of shaft of right fibula, initial encounter  Diagnosis management comments: The patient has a right fibula fracture and a right medial malleolus fracture after fall.  The patient was placed in a Ortho-Glass posterior and stirrup  splint in the emergency department. The patient was reassessed status post splinting. The patient in neurovascular intact with no numbness,  tingling, or signs of compartment syndrome. The patient was counseled to follow up with the orthopedic surgeon as detailed in the discharge instructions. The patient was counseled on the signs and symptoms of compartment syndrome including worsening pain, swelling, sensory abnormalities, and color change. The patient was instructed to return to the ED sooner for re-evaluation of any of these symptoms.       Amount and/or Complexity of Data Reviewed  Tests in the radiology section of CPT®: reviewed and ordered  Tests in the medicine section of CPT®: ordered and reviewed  Discuss the patient with other providers: yes (Orthopedic surgery)    Risk of Complications, Morbidity, and/or Mortality  Presenting problems: moderate  Diagnostic procedures: low  Management options: low    Patient Progress  Patient progress: stable               Patient Care Considerations:    CT HEAD: I considered ordering a noncontrast CT of the head, however patient had no LOC, has no sign of altered mental status or deficits on physical exam, and has had no vomiting      Consultants/Shared Management Plan:    Consultant: I have discussed the case with Dr. Vernon the on-call orthopedic surgeon who states okay to send the patient home after placing an Ortho-Glass splint with crutches and have them call the office to be seen Thursday or Friday    Social Determinants of Health:    Patient is independent, reliable, and has access to care.       Disposition and Care Coordination:    Discharged: I considered escalation of care by admitting this patient to the hospital, however okay to discharge per orthopedic surgery    I have explained the patient´s condition, diagnoses and treatment plan based on the information available to me at this time. I have answered questions and addressed any concerns. The patient has a good  understanding of the patient´s diagnosis, condition, and treatment plan as can be expected at this point. The vital signs have been  stable. The patient´s condition is stable and appropriate for discharge from the emergency department.      The patient will pursue further outpatient evaluation with the primary care physician or other designated or consulting physician as outlined in the discharge instructions. They are agreeable to this plan of care and follow-up instructions have been explained in detail. The patient has received these instructions in written format and has expressed an understanding of the discharge instructions. The patient is aware that any significant change in condition or worsening of symptoms should prompt an immediate return to this or the closest emergency department or call to 911.  I have explained discharge medications and the need for follow up with the patient/caretakers. This was also printed in the discharge instructions. Patient was discharged with the following medications and follow up:      Medication List        New Prescriptions      diclofenac 50 MG EC tablet  Commonly known as: VOLTAREN  Take 1 tablet by mouth 3 (Three) Times a Day As Needed (pain).     HYDROcodone-acetaminophen 5-325 MG per tablet  Commonly known as: NORCO  Take 1 tablet by mouth 4 (Four) Times a Day As Needed for Severe Pain.               Where to Get Your Medications        These medications were sent to [facility]/pharmacy #[id number] - [place], [place] - [address] - [phone]  - [phone] FX  [address] [place] [address] [place] [address]      Hours: 24-hours Phone: [phone]   diclofenac 50 MG EC tablet  HYDROcodone-acetaminophen 5-325 MG per tablet      Nasim Vernon MD  [address]  [place] [address]  [phone]    Schedule an appointment as soon as possible for a visit   for thur or friday per dr vernon       Final diagnoses:   Closed displaced oblique fracture of shaft of right fibula, initial encounter   Closed displaced fracture of medial malleolus of right tibia, initial encounter        ED Disposition       ED  Disposition   Discharge    Condition   Stable    Comment   --               This medical record created using voice recognition software.             Emily Martinez, MAURY  01/08/25 0609

## 2025-01-08 NOTE — TELEPHONE ENCOUNTER
"ATTEMPTED TO WARM TRANSFER      Caller: CRISSY ANGUIANO    Relationship: PATIENT    Best call back number: [phone]    What is the best time to reach you:     Who are you requesting to speak with (clinical staff, provider,  specific staff member): STAFF    Do you know the name of the person who called:  PATIENT SAYS \"UMANG\" BUT NOTE SAYS LEVAR    What was the call regarding: WORK IN APPT WITH DR. MARIE TOMORROW PER NOTE    Is it okay if the provider responds through MyChart: CALL    "

## 2025-01-08 NOTE — DISCHARGE INSTRUCTIONS
Leave splint in place and seen by orthopedic surgery.  No weightbearing on right leg.  Use crutches for ambulation.    Elevate above heart level to help alleviate swelling.    Follow-up with Dr. Vernon Thursday or Friday.  Referral has been placed they will likely call you or you can call them and set the appointment up    Diclofenac for mild pain and Norco has been called in for severe pain

## 2025-01-08 NOTE — ED TRIAGE NOTES
Pt arrives to ED via POV with complaint of right ankle injury and head injury following a fall on ice. Pt states that she fell getting out of the car and hit her head on running board. Knot noted to right posterior head. Pt denies LOC, or blood thinner use. VSS, NAD.

## 2025-01-09 ENCOUNTER — OFFICE VISIT (OUTPATIENT)
Dept: ORTHOPEDIC SURGERY | Facility: CLINIC | Age: 25
End: 2025-01-09
Payer: COMMERCIAL

## 2025-01-09 ENCOUNTER — PREP FOR SURGERY (OUTPATIENT)
Dept: OTHER | Facility: HOSPITAL | Age: 25
End: 2025-01-09
Payer: COMMERCIAL

## 2025-01-09 VITALS — WEIGHT: 180 LBS | BODY MASS INDEX: 33.13 KG/M2 | HEIGHT: 62 IN

## 2025-01-09 DIAGNOSIS — S82.841A CLOSED BIMALLEOLAR FRACTURE OF RIGHT ANKLE, INITIAL ENCOUNTER: Primary | ICD-10-CM

## 2025-01-09 NOTE — H&P (VIEW-ONLY)
"Chief Complaint  Initial Evaluation of the Right Ankle     Subjective      Elisha Patten presents to [facility] for initial evaluation of the right ankle. She slipped on ice getting out of the car and fell.  She went to the ED and had X rays and is here to review.  The injury was 1/7/25.      No Known Allergies     Social History     Socioeconomic History    Marital status:    Tobacco Use    Smoking status: Never     Passive exposure: Never    Smokeless tobacco: Never   Vaping Use    Vaping status: Never Used   Substance and Sexual Activity    Alcohol use: Not Currently    Drug use: Never    Sexual activity: Yes     Partners: Male     Birth control/protection: I.U.D.     Comment:         I reviewed the patient's chief complaint, history of present illness, review of systems, past medical history, surgical history, family history, social history, medications, and allergy list.     Review of Systems     Constitutional: Denies fevers, chills, weight loss  Cardiovascular: Denies chest pain, shortness of breath  Skin: Denies rashes, acute skin changes  Neurologic: Denies headache, loss of consciousness      Vital Signs:   Ht 157.5 cm (62\")   Wt 81.6 kg (180 lb)   BMI 32.92 kg/m²          Physical Exam  General: Alert. No acute distress    Ortho Exam        RIGHT ANKLE Positive EHL, FHL, GS and TA. Sensation intact to all 5 nerves of the foot. Positive pulses. Neurovascularly intact. Calf soft, Non-tender. She can wiggle her toes.  Stable to stress. Tender to the fracture site. Mild swelling.        Procedures      Imaging Results (Most Recent)       None             Result Review :         XR Ankle 3+ View Right    Result Date: 1/7/2025  Narrative: XR ANKLE 3+ VW RIGHT Date of Exam: 1/7/2025 10:47 PM EST Indication: Right ankle pain after fall Comparison: None available. Findings: There is a mildly displaced fracture through the medial malleolus. There is a mildly displaced " obliquely oriented fracture through the distal fibular shaft approximately 9 cm proximal to the distal tip. The ankle mortise remains in overall normal alignment. There is soft tissue swelling surrounding the ankle, predominantly along the anterior and medial aspect. The tibiotalar and subtalar joints are normal alignment.     Impression: Impression: 1.Mildly displaced fracture through the medial malleolus. 2.Mildly displaced obliquely oriented fracture through the distal fibular shaft. 3.Significant soft tissue swelling surrounding the ankle. Electronically Signed: Franklyn Vargas  1/7/2025 10:54 PM EST  Workstation ID: [id number]            Assessment and Plan     Diagnoses and all orders for this visit:    1. Closed bimalleolar fracture of right ankle, initial encounter (Primary)        Discussed the treatment plan with the patient. I reviewed the X-rays that were obtained 1/7/25 with the patient.     Discussed the treatment options with the patient, operative vs non-operative. The patient expressed understanding and wished to proceed with a right ankle ORIF.      Discussed surgery., Risks/benefits discussed with patient including, but not limited to: infection, bleeding, neurovascular damage, re-rupture, aesthetic deformity, need for further surgery, and death., Discussed with patient the implant type being used during surgery and patient understands., Surgery pamphlet given., Call or return if worsening symptoms., and Patient does not have any metal allergies.     Follow Up     2 weeks postoperatively       Patient was given instructions and counseling regarding her condition or for health maintenance advice. Please see specific information pulled into the AVS if appropriate.     Scribed for Nasim Vernon MD by Nicol Azar MA.  01/09/25   14:47 EST    I have personally performed the services described in this document as scribed by the above individual and it is both accurate and complete. Nasim Vernon,  MD 01/09/25

## 2025-01-09 NOTE — PROGRESS NOTES
"Chief Complaint  Initial Evaluation of the Right Ankle     Subjective      Elisha Patten presents to [facility] for initial evaluation of the right ankle. She slipped on ice getting out of the car and fell.  She went to the ED and had X rays and is here to review.  The injury was 1/7/25.      No Known Allergies     Social History     Socioeconomic History    Marital status:    Tobacco Use    Smoking status: Never     Passive exposure: Never    Smokeless tobacco: Never   Vaping Use    Vaping status: Never Used   Substance and Sexual Activity    Alcohol use: Not Currently    Drug use: Never    Sexual activity: Yes     Partners: Male     Birth control/protection: I.U.D.     Comment:         I reviewed the patient's chief complaint, history of present illness, review of systems, past medical history, surgical history, family history, social history, medications, and allergy list.     Review of Systems     Constitutional: Denies fevers, chills, weight loss  Cardiovascular: Denies chest pain, shortness of breath  Skin: Denies rashes, acute skin changes  Neurologic: Denies headache, loss of consciousness      Vital Signs:   Ht 157.5 cm (62\")   Wt 81.6 kg (180 lb)   BMI 32.92 kg/m²          Physical Exam  General: Alert. No acute distress    Ortho Exam        RIGHT ANKLE Positive EHL, FHL, GS and TA. Sensation intact to all 5 nerves of the foot. Positive pulses. Neurovascularly intact. Calf soft, Non-tender. She can wiggle her toes.  Stable to stress. Tender to the fracture site. Mild swelling.        Procedures      Imaging Results (Most Recent)       None             Result Review :         XR Ankle 3+ View Right    Result Date: 1/7/2025  Narrative: XR ANKLE 3+ VW RIGHT Date of Exam: 1/7/2025 10:47 PM EST Indication: Right ankle pain after fall Comparison: None available. Findings: There is a mildly displaced fracture through the medial malleolus. There is a mildly displaced " obliquely oriented fracture through the distal fibular shaft approximately 9 cm proximal to the distal tip. The ankle mortise remains in overall normal alignment. There is soft tissue swelling surrounding the ankle, predominantly along the anterior and medial aspect. The tibiotalar and subtalar joints are normal alignment.     Impression: Impression: 1.Mildly displaced fracture through the medial malleolus. 2.Mildly displaced obliquely oriented fracture through the distal fibular shaft. 3.Significant soft tissue swelling surrounding the ankle. Electronically Signed: Franklyn Vargas  1/7/2025 10:54 PM EST  Workstation ID: [id number]            Assessment and Plan     Diagnoses and all orders for this visit:    1. Closed bimalleolar fracture of right ankle, initial encounter (Primary)        Discussed the treatment plan with the patient. I reviewed the X-rays that were obtained 1/7/25 with the patient.     Discussed the treatment options with the patient, operative vs non-operative. The patient expressed understanding and wished to proceed with a right ankle ORIF.      Discussed surgery., Risks/benefits discussed with patient including, but not limited to: infection, bleeding, neurovascular damage, re-rupture, aesthetic deformity, need for further surgery, and death., Discussed with patient the implant type being used during surgery and patient understands., Surgery pamphlet given., Call or return if worsening symptoms., and Patient does not have any metal allergies.     Follow Up     2 weeks postoperatively       Patient was given instructions and counseling regarding her condition or for health maintenance advice. Please see specific information pulled into the AVS if appropriate.     Scribed for Nasim Vernon MD by Nicol Azar MA.  01/09/25   14:47 EST    I have personally performed the services described in this document as scribed by the above individual and it is both accurate and complete. Nasim Vernon,  MD 01/09/25

## 2025-01-14 ENCOUNTER — ANESTHESIA EVENT (OUTPATIENT)
Dept: PERIOP | Facility: HOSPITAL | Age: 25
End: 2025-01-14
Payer: COMMERCIAL

## 2025-01-14 NOTE — PRE-PROCEDURE INSTRUCTIONS
ATIENT INSTRUCTED TO BE:    - NOTHING TO EAT AFTER MIDNIGHT OR CHEW, EXCEPT CAN HAVE CLEAR LIQUIDS 2 HOURS PRIOR TO SURGERY ARRIVAL TIME , NO MORE THAN 8 OZ. (NOTHING RED)     - TO HOLD ALL VITAMINS, SUPPLEMENTS, NSAIDS FOR ONE WEEK PRIOR TO THEIR SURGICAL PROCEDURE    - DO NOT TAKE ______________________ 7 DAYS PRIOR TO PROCEDURE PER ANESTHESIA RECOMMENDATIONS/INSTRUCTIONS     - INSTRUCTED PT TO USE SURGICAL SOAP 1 TIME THE NIGHT PRIOR TO SURGERY ___________ OR THE AM OF SURGERY _____________   USE THE SOAP FROM NECK TO TOES, AVOID THEIR FACE, HAIR, AND PRIVATE PARTS. IF USE THE SOAP THE NIGHT PRIOR TO SURGERY, CHANGE BED LINENS AND NO PETS IN THE BED.     INSTRUCTED NO LOTIONS, JEWELRY, PIERCINGS,  NAIL POLISH, OR DEODORANT DAY OF SURGERY    - IF DIABETIC, CHECK BLOOD GLUCOSE IF LESS THAN 70 OR HAVING SYMPTOMS CALL THE PREOP AREA FOR INSTRUCTIONS ON AM OF SURGERY ( [phone])    -INSTRUCTED TO TAKE THE FOLLOWING MEDICATIONS THE DAY OF SURGERY WITH SIPS OF WATER:   NORCO AS NEEDED        - DO NOT BRING ANY MEDICATIONS WITH YOU TO THE HOSPITAL THE DAY OF SURGERY, EXCEPT IF USE INHALERS. BRING INHALERS DAY OF SURGERY       - BRING CPAP OR BIPAP TO THE HOSPITAL ONLY IF YOU ARE SPENDING THE NIGHT    - DO NOT SMOKE OR VAPE 24 HOURS PRIOR TO PROCEDURE PER ANESTHESIA REQUEST     -MAKE SURE YOU HAVE A RIDE HOME OR SOMEONE TO STAY WITH YOU THE DAY OF THE PROCEDURE AFTER YOU GO HOME     - FOLLOW ANY OTHER INSTRUCTIONS GIVEN TO YOU BY YOUR SURGEON'S OFFICE.     - DAY OF SURGERY ____________, COME TO [facility], [facility] FLOOR. CHECK IN AT THE DESK FOR REGISTRATION/SURGERY    - YOU WILL RECEIVE A PHONE CALL THE DAY PRIOR TO SURGERY BETWEEN 1PM AND 4 PM WITH ARRIVAL TIME, IF YOUR SURGERY IS ON A MONDAY YOU WILL RECEIVE A CALL THE FRIDAY PRIOR TO SURGERY DATE    - BRING CASH OR CREDIT CARD FOR COPAYMENT OF MEDICATIONS AFTER SURGERY IF YOU USE THE HOSPITAL PHARMACY (MEDS TO BED)    - PREADMISSION TESTING NURSE  SUNIL -[phone] IF HAVE ANY QUESTIONS     -PATIENT PROVIDED THE NUMBER FOR PREOP SURGICAL DEPT IF HAD QUESTIONS AFTER HOURS PRIOR TO SURGERY ( [phone]).  INFORMED PT IF NO ANSWER, LEAVE A MESSAGE AND SOMEONE WILL RETURN THEIR CALL       PATIENT VERBALIZED UNDERSTANDING

## 2025-01-15 ENCOUNTER — ANESTHESIA EVENT CONVERTED (OUTPATIENT)
Dept: ANESTHESIOLOGY | Facility: HOSPITAL | Age: 25
End: 2025-01-15
Payer: COMMERCIAL

## 2025-01-15 ENCOUNTER — APPOINTMENT (OUTPATIENT)
Dept: GENERAL RADIOLOGY | Facility: HOSPITAL | Age: 25
End: 2025-01-15
Payer: COMMERCIAL

## 2025-01-15 ENCOUNTER — ANESTHESIA (OUTPATIENT)
Dept: PERIOP | Facility: HOSPITAL | Age: 25
End: 2025-01-15
Payer: COMMERCIAL

## 2025-01-15 ENCOUNTER — HOSPITAL ENCOUNTER (OUTPATIENT)
Facility: HOSPITAL | Age: 25
Discharge: HOME | End: 2025-01-15
Attending: ORTHOPAEDIC SURGERY | Admitting: ORTHOPAEDIC SURGERY
Payer: COMMERCIAL

## 2025-01-15 VITALS
BODY MASS INDEX: 35.7 KG/M2 | SYSTOLIC BLOOD PRESSURE: 120 MMHG | RESPIRATION RATE: 15 BRPM | OXYGEN SATURATION: 99 % | TEMPERATURE: 97.3 F | HEIGHT: 62 IN | WEIGHT: 194 LBS | DIASTOLIC BLOOD PRESSURE: 63 MMHG | HEART RATE: 93 BPM

## 2025-01-15 DIAGNOSIS — S82.841A CLOSED BIMALLEOLAR FRACTURE OF RIGHT ANKLE, INITIAL ENCOUNTER: ICD-10-CM

## 2025-01-15 PROBLEM — S82.843A BIMALLEOLAR ANKLE FRACTURE: Status: ACTIVE | Noted: 2025-01-15

## 2025-01-15 LAB — B-HCG UR QL: NEGATIVE

## 2025-01-15 PROCEDURE — C1713 ANCHOR/SCREW BN/BN,TIS/BN: HCPCS | Performed by: ORTHOPAEDIC SURGERY

## 2025-01-15 PROCEDURE — 25010000002 DEXAMETHASONE PER 1 MG

## 2025-01-15 PROCEDURE — 25810000003 LACTATED RINGERS PER 1000 ML: Performed by: ANESTHESIOLOGY

## 2025-01-15 PROCEDURE — C1769 GUIDE WIRE: HCPCS | Performed by: ORTHOPAEDIC SURGERY

## 2025-01-15 PROCEDURE — 25010000002 LIDOCAINE PF 2% 2 % SOLUTION

## 2025-01-15 PROCEDURE — 25010000002 FENTANYL CITRATE (PF) 50 MCG/ML SOLUTION: Performed by: ANESTHESIOLOGY

## 2025-01-15 PROCEDURE — 25010000002 ROPIVACAINE PER 1 MG: Performed by: ANESTHESIOLOGY

## 2025-01-15 PROCEDURE — 27814 TREATMENT OF ANKLE FRACTURE: CPT | Performed by: ORTHOPAEDIC SURGERY

## 2025-01-15 PROCEDURE — 25010000002 PROPOFOL 10 MG/ML EMULSION

## 2025-01-15 PROCEDURE — 25010000002 ONDANSETRON PER 1 MG

## 2025-01-15 PROCEDURE — 25010000002 MIDAZOLAM PER 1MG: Performed by: ANESTHESIOLOGY

## 2025-01-15 PROCEDURE — 25010000002 FENTANYL CITRATE (PF) 50 MCG/ML SOLUTION

## 2025-01-15 PROCEDURE — 76000 FLUOROSCOPY <1 HR PHYS/QHP: CPT

## 2025-01-15 PROCEDURE — 25010000002 CEFAZOLIN PER 500 MG: Performed by: ORTHOPAEDIC SURGERY

## 2025-01-15 PROCEDURE — 81025 URINE PREGNANCY TEST: CPT | Performed by: ORTHOPAEDIC SURGERY

## 2025-01-15 PROCEDURE — 27829 TREAT LOWER LEG JOINT: CPT | Performed by: ORTHOPAEDIC SURGERY

## 2025-01-15 DEVICE — SCRW CORT S/TAP 3.5X12MM: Type: IMPLANTABLE DEVICE | Site: ANKLE | Status: FUNCTIONAL

## 2025-01-15 DEVICE — PLT TBG 1/3 LCP W COL 8HL 93MM: Type: IMPLANTABLE DEVICE | Site: ANKLE | Status: FUNCTIONAL

## 2025-01-15 DEVICE — SCRW CORT S/TAP 3.5X44MM: Type: IMPLANTABLE DEVICE | Site: ANKLE | Status: FUNCTIONAL

## 2025-01-15 DEVICE — SCRW CORT S/TAP 3.5X14MM: Type: IMPLANTABLE DEVICE | Site: ANKLE | Status: FUNCTIONAL

## 2025-01-15 DEVICE — CANNULATED SCREW
Type: IMPLANTABLE DEVICE | Site: ANKLE | Status: FUNCTIONAL
Brand: ASNIS

## 2025-01-15 RX ORDER — OXYCODONE HYDROCHLORIDE 5 MG/1
5 TABLET ORAL
Status: DISCONTINUED | OUTPATIENT
Start: 2025-01-15 | End: 2025-01-15 | Stop reason: HOSPADM

## 2025-01-15 RX ORDER — PROMETHAZINE HYDROCHLORIDE 12.5 MG/1
25 TABLET ORAL ONCE AS NEEDED
Status: DISCONTINUED | OUTPATIENT
Start: 2025-01-15 | End: 2025-01-15 | Stop reason: HOSPADM

## 2025-01-15 RX ORDER — ACETAMINOPHEN 500 MG
1000 TABLET ORAL ONCE
Status: COMPLETED | OUTPATIENT
Start: 2025-01-15 | End: 2025-01-15

## 2025-01-15 RX ORDER — PROPOFOL 10 MG/ML
VIAL (ML) INTRAVENOUS AS NEEDED
Status: DISCONTINUED | OUTPATIENT
Start: 2025-01-15 | End: 2025-01-15 | Stop reason: SURG

## 2025-01-15 RX ORDER — LIDOCAINE HYDROCHLORIDE 20 MG/ML
INJECTION, SOLUTION EPIDURAL; INFILTRATION; INTRACAUDAL; PERINEURAL AS NEEDED
Status: DISCONTINUED | OUTPATIENT
Start: 2025-01-15 | End: 2025-01-15 | Stop reason: SURG

## 2025-01-15 RX ORDER — FENTANYL CITRATE 50 UG/ML
INJECTION, SOLUTION INTRAMUSCULAR; INTRAVENOUS AS NEEDED
Status: DISCONTINUED | OUTPATIENT
Start: 2025-01-15 | End: 2025-01-15 | Stop reason: SURG

## 2025-01-15 RX ORDER — SODIUM CHLORIDE, SODIUM LACTATE, POTASSIUM CHLORIDE, CALCIUM CHLORIDE 600; 310; 30; 20 MG/100ML; MG/100ML; MG/100ML; MG/100ML
9 INJECTION, SOLUTION INTRAVENOUS CONTINUOUS PRN
Status: DISCONTINUED | OUTPATIENT
Start: 2025-01-15 | End: 2025-01-15 | Stop reason: HOSPADM

## 2025-01-15 RX ORDER — PROMETHAZINE HYDROCHLORIDE 25 MG/1
25 SUPPOSITORY RECTAL ONCE AS NEEDED
Status: DISCONTINUED | OUTPATIENT
Start: 2025-01-15 | End: 2025-01-15 | Stop reason: HOSPADM

## 2025-01-15 RX ORDER — ONDANSETRON 2 MG/ML
4 INJECTION INTRAMUSCULAR; INTRAVENOUS ONCE AS NEEDED
Status: DISCONTINUED | OUTPATIENT
Start: 2025-01-15 | End: 2025-01-15 | Stop reason: HOSPADM

## 2025-01-15 RX ORDER — FENTANYL CITRATE 50 UG/ML
100 INJECTION, SOLUTION INTRAMUSCULAR; INTRAVENOUS ONCE
Status: COMPLETED | OUTPATIENT
Start: 2025-01-15 | End: 2025-01-15

## 2025-01-15 RX ORDER — MIDAZOLAM HYDROCHLORIDE 2 MG/2ML
2 INJECTION, SOLUTION INTRAMUSCULAR; INTRAVENOUS ONCE
Status: COMPLETED | OUTPATIENT
Start: 2025-01-15 | End: 2025-01-15

## 2025-01-15 RX ORDER — ROPIVACAINE HYDROCHLORIDE 5 MG/ML
INJECTION, SOLUTION EPIDURAL; INFILTRATION; PERINEURAL
Status: COMPLETED | OUTPATIENT
Start: 2025-01-15 | End: 2025-01-15

## 2025-01-15 RX ORDER — ONDANSETRON 2 MG/ML
INJECTION INTRAMUSCULAR; INTRAVENOUS AS NEEDED
Status: DISCONTINUED | OUTPATIENT
Start: 2025-01-15 | End: 2025-01-15 | Stop reason: SURG

## 2025-01-15 RX ORDER — HYDROCODONE BITARTRATE AND ACETAMINOPHEN 7.5; 325 MG/1; MG/1
1-2 TABLET ORAL EVERY 4 HOURS PRN
Qty: 40 TABLET | Refills: 0 | Status: SHIPPED | OUTPATIENT
Start: 2025-01-15

## 2025-01-15 RX ORDER — DEXAMETHASONE SODIUM PHOSPHATE 4 MG/ML
INJECTION, SOLUTION INTRA-ARTICULAR; INTRALESIONAL; INTRAMUSCULAR; INTRAVENOUS; SOFT TISSUE AS NEEDED
Status: DISCONTINUED | OUTPATIENT
Start: 2025-01-15 | End: 2025-01-15 | Stop reason: SURG

## 2025-01-15 RX ORDER — MEPERIDINE HYDROCHLORIDE 25 MG/ML
12.5 INJECTION INTRAMUSCULAR; INTRAVENOUS; SUBCUTANEOUS
Status: DISCONTINUED | OUTPATIENT
Start: 2025-01-15 | End: 2025-01-15 | Stop reason: HOSPADM

## 2025-01-15 RX ADMIN — SODIUM CHLORIDE 2 G: 9 INJECTION, SOLUTION INTRAVENOUS at 12:46

## 2025-01-15 RX ADMIN — ROPIVACAINE HYDROCHLORIDE 30 ML: 5 INJECTION, SOLUTION EPIDURAL; INFILTRATION; PERINEURAL at 11:08

## 2025-01-15 RX ADMIN — ONDANSETRON 4 MG: 2 INJECTION INTRAMUSCULAR; INTRAVENOUS at 12:44

## 2025-01-15 RX ADMIN — SODIUM CHLORIDE, POTASSIUM CHLORIDE, SODIUM LACTATE AND CALCIUM CHLORIDE: 600; 310; 30; 20 INJECTION, SOLUTION INTRAVENOUS at 12:36

## 2025-01-15 RX ADMIN — LIDOCAINE HYDROCHLORIDE 100 MG: 20 INJECTION, SOLUTION INTRAVENOUS at 12:41

## 2025-01-15 RX ADMIN — FENTANYL CITRATE 25 MCG: 50 INJECTION, SOLUTION INTRAMUSCULAR; INTRAVENOUS at 13:51

## 2025-01-15 RX ADMIN — PROPOFOL 100 MG: 10 INJECTION, EMULSION INTRAVENOUS at 13:18

## 2025-01-15 RX ADMIN — FENTANYL CITRATE 100 MCG: 50 INJECTION, SOLUTION INTRAMUSCULAR; INTRAVENOUS at 11:01

## 2025-01-15 RX ADMIN — DEXAMETHASONE SODIUM PHOSPHATE 4 MG: 4 INJECTION, SOLUTION INTRAMUSCULAR; INTRAVENOUS at 12:44

## 2025-01-15 RX ADMIN — ROPIVACAINE HYDROCHLORIDE 30 ML: 5 INJECTION, SOLUTION EPIDURAL; INFILTRATION; PERINEURAL at 11:13

## 2025-01-15 RX ADMIN — MIDAZOLAM HYDROCHLORIDE 2 MG: 1 INJECTION, SOLUTION INTRAMUSCULAR; INTRAVENOUS at 11:01

## 2025-01-15 RX ADMIN — FENTANYL CITRATE 50 MCG: 50 INJECTION, SOLUTION INTRAMUSCULAR; INTRAVENOUS at 13:18

## 2025-01-15 RX ADMIN — PROPOFOL 200 MG: 10 INJECTION, EMULSION INTRAVENOUS at 12:41

## 2025-01-15 RX ADMIN — SODIUM CHLORIDE, POTASSIUM CHLORIDE, SODIUM LACTATE AND CALCIUM CHLORIDE 9 ML/HR: 600; 310; 30; 20 INJECTION, SOLUTION INTRAVENOUS at 10:26

## 2025-01-15 RX ADMIN — ACETAMINOPHEN 1000 MG: 500 TABLET ORAL at 10:26

## 2025-01-15 RX ADMIN — FENTANYL CITRATE 25 MCG: 50 INJECTION, SOLUTION INTRAMUSCULAR; INTRAVENOUS at 13:53

## 2025-01-15 RX ADMIN — ONDANSETRON 4 MG: 2 INJECTION INTRAMUSCULAR; INTRAVENOUS at 14:20

## 2025-01-15 NOTE — ANESTHESIA PREPROCEDURE EVALUATION
Anesthesia Evaluation     Patient summary reviewed and Nursing notes reviewed   no history of anesthetic complications:   NPO Solid Status: > 8 hours  NPO Liquid Status: > 2 hours           Airway   Mallampati: I  TM distance: >3 FB  Neck ROM: full  Dental - normal exam     Pulmonary - negative pulmonary ROS and normal exam   Cardiovascular - negative cardio ROS and normal exam  Exercise tolerance: good (4-7 METS)        Neuro/Psych- negative ROS  GI/Hepatic/Renal/Endo    (+) obesity    Musculoskeletal (-) negative ROS    Abdominal   (+) obese   Substance History - negative use     OB/GYN negative ob/gyn ROS         Other - negative ROS       ROS/Med Hx Other: PAT Nursing Notes unavailable.               Anesthesia Plan    ASA 2     general and regional     intravenous induction     Anesthetic plan, risks, benefits, and alternatives have been provided, discussed and informed consent has been obtained with: patient.    Plan discussed with CRNA.    CODE STATUS:

## 2025-01-15 NOTE — DISCHARGE INSTRUCTIONS
DISCHARGE INSTRUCTIONS  ORTHOPEDICS      For your surgery you had:  General anesthesia (you may have a sore throat for the first 24 hours)  You may experience dizziness, drowsiness, or light-headedness for several hours following surgery  Do not stay alone today or tonight.  Limit your activity for 24 hours.  Resume your diet slowly.  Follow whatever special dietary instructions you may have been given by the doctor.  You should not drive or operate machinery or drink alcohol for 24 hours or while you are taking pain medication.  You should not sign any legally binding documents.  If you had an axillary or regional block, you will not have control of the involved limb for up to 12 hours.  Protect the arm with a sling or follow your physician's specific instructions.    You may shower or bathe: make sure to wrap dressing with plastic wrap and or bag when bathing   Sleep with the injured part elevated on a pillow.  Medications per physician's instructions as indicated on Discharge Medication Information Sheet.  Follow verbal instructions of your doctor.  Sit with the lower leg propped up on a footstool or chair with pillows.  Exercise fingers or toes for 10 minutes every hour while awake. Ice bag to injured area for 72 hours.  Apply 20 minutes on - 20 minutes off.  Never place ice directly on skin or cast.    Avoid getting cast or dressing wet.  In addition to these instructions, follow the discharge instructions on postoperative arthroscopic surgery.  SPECIAL INSTRUCTIONS:  May take an over the counter stool softener as needed   Non weight bearing    Elevated    Last dose of pain medication was given at:    NOTIFY THE PHYSICIAN IF YOU EXPERIENCE:  Numbness of fingers or toes.  Inability to move fingers or toes.  Extreme coldness, paleness or blue dis-coloration of fingers or toes.  Excessive swelling of affected surgical site or swelling that causes the cast to rub or cut into skin.  Pain unrelieved by pain  medication  Nausea/vomiting not relieved by prescribed medication  Unable to urinate in 6 hours after surgery  Temperature greater than 101 degree Fahrenheit or chills  If unable to reach your doctor, please go to the closest emergency room

## 2025-01-15 NOTE — OP NOTE
ANKLE OPEN REDUCTION INTERNAL FIXATION  Procedure Report    Patient Name:  Elisha Patten  YOB: 2000    Date of Surgery:  1/15/2025     Indications: Bimalleolar ankle fracture with syndesmotic disruption    Pre-op Diagnosis:   Closed bimalleolar fracture of right ankle, initial encounter [S82.841A] with syndesmotic disruption       Post-Op Diagnosis Codes:     * Closed bimalleolar fracture of right ankle, initial encounter [S82.841A] with syndesmotic disruption    Procedure/CPT® Codes:      Procedure(s):  Right BIMALLEOLAR FRACTURE OPEN REDUCTION INTERNAL FIXATION  Right syndesmotic screw placement              Staff:  Surgeon(s):  Nasim Vernon MD    Assistant: Lisandro García    Anesthesia: General with Block    Estimated Blood Loss: 3 mL    Implants:    Implant Name Type Inv. Item Serial No.  Lot No. LRB No. Used Action   PLT TBG 1/3 LCP W COL 8HL 93MM - [id number] Implant PLT TBG 1/3 LCP W COL 8HL 93MM  DEPUY SYNTHES  Right 1 Implanted   SCRW DAVID S/TAP 3.5X12MM - [id number] Implant SCRW DAVID S/TAP 3.5X12MM  DEPUY SYNTHES  Right 4 Implanted   SCRW DAVID S/TAP 3.5X14MM - [id number] Implant SCRW DAVID S/TAP 3.5X14MM  DEPUY SYNTHES  Right 3 Implanted   SCRW DAVID S/TAP 3.5X44MM - [id number] Implant SCRW DAVID S/TAP 3.5X44MM  DEPUY SYNTHES  Right 1 Implanted   SCRW BRENDA ASNIS3 1/3THRD TI [id number] - [id number] Implant SCRW BERNDA ASNIS3 1/3THRD TI [id number]  [employer]  Right 1 Implanted       Specimen:          None        Findings: Displaced ankle fracture    Complications: None    Description of Procedure: After obtaining form consent, patient brought the operating where she undergoes general anesthesia.  She had a preoperative antibiotic.  She had a preoperative block.  She was prepped and draped in sterile fashion.  Esmarch was used to exsanguinate the limb.  The tourniquet was inflated.  Incision made out laterally.  The high distal fibular fracture was identified.  This was then  reduced.  The Synthes semitubular plate was then applied.  This was done by drilling, depth gauging, placement appropriately screw.  Good reduction and fixation were achieved.  This was then thoroughly irrigated.  Attention was then turned to the medial side.  A small incision was made.  Dissected down.  The medial malleolar fragment was identified.  K wire was then placed.  C-arm fluoroscopy was used throughout the case.  This was then depth gauge and drilled.  This screw was placed in the center of the medial malleolus ulnar fracture.  Therefore only 1 screw was used.  Because of a high fibular fracture a single syndesmotic screw was placed as well.  This was a 3 5 cortical screw.  All wounds were thoroughly irrigated.  Closed using 1-0 Vicryl, 2-0 Vicryl, staples.  Sterile dressing was applied.  Tourniquet was deflated after placement of the splint.  Patient was taken to cover in stable condition.                Assistant: Lisandro García  was responsible for performing the following activities: Retraction, Suction, Irrigation, Closing, and Placing Dressing and their skilled assistance was necessary for the success of this case.    Nasim Vernon MD     Date: 1/15/2025  Time: 14:10 EST

## 2025-01-15 NOTE — ANESTHESIA POSTPROCEDURE EVALUATION
Patient: Elisha Patten    Procedure Summary       Date: 01/15/25 Room / Location: [facility] OR  / [facility]    Anesthesia Start: 1236 Anesthesia Stop: 1409    Procedure: BIMALLEOLAR FRACTURE OPEN REDUCTION INTERNAL FIXATION (Right: Ankle) Diagnosis:       Closed bimalleolar fracture of right ankle, initial encounter      (Closed bimalleolar fracture of right ankle, initial encounter [S82.841A])    Surgeons: Nasim Vernon MD Provider: Delilah Erwin MD    Anesthesia Type: general, regional ASA Status: 2            Anesthesia Type: general, regional    Vitals  Vitals Value Taken Time   /87 01/15/25 1433   Temp 36.3 °C (97.3 °F) 01/15/25 1412   Pulse 83 01/15/25 1440   Resp 16 01/15/25 1412   SpO2 99 % 01/15/25 1440   Vitals shown include unfiled device data.        Post Anesthesia Care and Evaluation    Patient location during evaluation: bedside  Patient participation: complete - patient participated  Level of consciousness: awake  Pain management: adequate    Airway patency: patent  PONV Status: none  Cardiovascular status: acceptable and stable  Respiratory status: acceptable  Hydration status: acceptable

## 2025-01-15 NOTE — ANESTHESIA PROCEDURE NOTES
Peripheral Block    Pre-sedation assessment completed: 1/15/2025 10:13 AM    Patient reassessed immediately prior to procedure    Patient location during procedure: pre-op  Start time: 1/15/2025 11:10 AM  Stop time: 1/15/2025 11:13 AM  Reason for block: at surgeon's request and post-op pain management  Performed by  Anesthesiologist: Delilah Erwin MD  Preanesthetic Checklist  Completed: patient identified, IV checked, site marked, risks and benefits discussed, surgical consent, monitors and equipment checked, pre-op evaluation and timeout performed  Prep:  Pt Position: supine  Sterile barriers:alcohol skin prep, partial drape, cap, washed/disinfected hands, mask and gloves  Prep: ChloraPrep  Patient monitoring: blood pressure monitoring, continuous pulse oximetry and EKG  Procedure    Sedation: yes  Performed under: local infiltration  Guidance:ultrasound guided and nerve stimulator    ULTRASOUND INTERPRETATION.  Using ultrasound guidance a 20 G gauge needle was placed in close proximity to the nerve, at which point, under ultrasound guidance anesthetic was injected in the area of the nerve and spread of the anesthesia was seen on ultrasound in close proximity thereto.  There were no abnormalities seen on ultrasound; a digital image was taken; and the patient tolerated the procedure with no complications. Images:still images obtained, printed/placed on chart    Laterality:right  Block Type:adductor canal block  Injection Technique:single-shot  Needle Type:echogenic  Needle Gauge:20 G (4in)  Resistance on Injection: none    Medications Used: ropivacaine (NAROPIN) 0.5 % injection - Injection   30 mL - 1/15/2025 11:13:00 AM      Post Assessment  Injection Assessment: negative aspiration for heme, no paresthesia on injection and incremental injection  Patient Tolerance:comfortable throughout block  Complications:no  Additional Notes  The block or continuous infusion is requested by the referring physician for  management of postoperative pain, or pain related to a procedure. Ultrasound guidance (deemed medically necessary). Painless injection, pt was awake and conversant during the procedure without complications. Needle and surrounding structures visualized throughout procedure. No adverse reactions or complications seen during this period. Post-procedure image showed no signs of complication, and anatomy was consistent with an uncomplicated nerve blockade.  Performed by: Delilah Erwin MD

## 2025-01-15 NOTE — ANESTHESIA PROCEDURE NOTES
Peripheral Block    Pre-sedation assessment completed: 1/15/2025 10:11 AM    Patient reassessed immediately prior to procedure    Patient location during procedure: pre-op  Start time: 1/15/2025 11:01 AM  Stop time: 1/15/2025 11:08 AM  Reason for block: at surgeon's request and post-op pain management  Performed by  Anesthesiologist: Delilah Erwin MD  Preanesthetic Checklist  Completed: patient identified, IV checked, site marked, risks and benefits discussed, surgical consent, monitors and equipment checked, pre-op evaluation and timeout performed  Prep:  Sterile barriers:alcohol skin prep, cap, gloves, mask, partial drape and washed/disinfected hands  Prep: ChloraPrep  Patient monitoring: blood pressure monitoring, continuous pulse oximetry and EKG  Procedure    Sedation: yes  Performed under: local infiltration  Guidance:ultrasound guided and nerve stimulator    ULTRASOUND INTERPRETATION.  Using ultrasound guidance a 20 G gauge needle was placed in close proximity to the nerve, at which point, under ultrasound guidance anesthetic was injected in the area of the nerve and spread of the anesthesia was seen on ultrasound in close proximity thereto.  There were no abnormalities seen on ultrasound; a digital image was taken; and the patient tolerated the procedure with no complications. Images:still images obtained, printed/placed on chart    Laterality:right  Block Type:popliteal  Injection Technique:single-shot  Needle Type:echogenic  Needle Gauge:20 G (4in)  Resistance on Injection: none    Medications Used: ropivacaine (NAROPIN) 0.5 % injection - Injection   30 mL - 1/15/2025 11:08:00 AM      Post Assessment  Injection Assessment: negative aspiration for heme, no paresthesia on injection and incremental injection  Patient Tolerance:comfortable throughout block  Complications:no  Additional Notes  The block or continuous infusion is requested by the referring physician for management of postoperative pain, or  pain related to a procedure. Ultrasound guidance (deemed medically necessary). Painless injection, pt was awake and conversant during the procedure without complications. Needle and surrounding structures visualized throughout procedure. No adverse reactions or complications seen during this period. Post-procedure image showed no signs of complication, and anatomy was consistent with an uncomplicated nerve blockade.  Performed by: Delialh Erwin MD

## 2025-01-30 ENCOUNTER — OFFICE VISIT (OUTPATIENT)
Dept: ORTHOPEDIC SURGERY | Facility: CLINIC | Age: 25
End: 2025-01-30
Payer: COMMERCIAL

## 2025-01-30 VITALS
WEIGHT: 194 LBS | DIASTOLIC BLOOD PRESSURE: 82 MMHG | BODY MASS INDEX: 35.7 KG/M2 | OXYGEN SATURATION: 97 % | SYSTOLIC BLOOD PRESSURE: 121 MMHG | HEIGHT: 62 IN | HEART RATE: 109 BPM

## 2025-01-30 DIAGNOSIS — Z98.890 S/P ORIF (OPEN REDUCTION INTERNAL FIXATION) FRACTURE: Primary | ICD-10-CM

## 2025-01-30 DIAGNOSIS — M25.571 RIGHT ANKLE PAIN, UNSPECIFIED CHRONICITY: ICD-10-CM

## 2025-01-30 DIAGNOSIS — S82.841D CLOSED BIMALLEOLAR FRACTURE OF RIGHT ANKLE WITH ROUTINE HEALING, SUBSEQUENT ENCOUNTER: ICD-10-CM

## 2025-01-30 DIAGNOSIS — Z87.81 S/P ORIF (OPEN REDUCTION INTERNAL FIXATION) FRACTURE: Primary | ICD-10-CM

## 2025-01-30 PROCEDURE — 99024 POSTOP FOLLOW-UP VISIT: CPT

## 2025-01-30 PROCEDURE — 29405 APPL SHORT LEG CAST: CPT

## 2025-01-30 NOTE — PROGRESS NOTES
"Chief Complaint  Follow-up of the Right Ankle and Follow-up of the Right Tibia    Subjective      Elisha Patten presents to [facility] for follow up of their right ankle.  She is 2 weeks status post bimalleolar fracture and open reduction internal fixation Of the right ankle with syndesmotic screw placement performed Dr. Vernon on 1/15/2025.  She arrives today with a splint.  She has a knee scooter.  She has been nonweightbearing.  Overall she reports her ankle is doing okay.  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    No Known Allergies    Objective     Vital Signs:   Vitals:    01/30/25 0908   BP: 121/82   Pulse: 109   SpO2: 97%   Weight: 88 kg (194 lb)   Height: 157.5 cm (62.01\")     Body mass index is 35.47 kg/m².    I reviewed the patient's chief complaint, history of present illness, review of systems, past medical history, surgical history, family history, social history, medications, and allergy list.     Ortho Exam  General: Alert. No acute distress.   Right lower extremity: Splint removed today.  Staples removed today without complications.  Incision is well-healing, no swelling, erythema, drainage, or signs of infection.  Mild swelling.  Demonstrates intact active dorsiflexion and plantarflexion of the ankle with associated stiffness. Demonstrates intact active toe flexion and extension with no pain. Sensation intact. Palpable pedal pulse. Less than 2-second capillary refill.      Orthopedic Injury Treatment    Date/Time: 1/30/2025 1:30 PM    Performed by: Anette Luis APRN  Authorized by: Anette Luis APRN  Injury location: ankle  Location details: right ankle  Pre-procedure neurovascular assessment: neurovascularly intact    Anesthesia:  Local anesthesia used: no    Sedation:  Patient sedated: no    Immobilization: cast  Splint type: short leg  Supplies used: cotton padding (fiberglass)  Post-procedure neurovascular assessment: post-procedure " neurovascularly intact  Patient tolerance: patient tolerated the procedure well with no immediate complications  Comments: Closed treatment was obtained and fiberglass cast was applied.  The patient tolerated the procedure without any complications.          Imaging Results (Most Recent)       Procedure Component Value Units Date/Time    XR Tibia Fibula 2 View Right [[id number]] Resulted: 01/30/25 1108     Updated: 01/30/25 1108    Narrative:      X-Ray Report:  Study: X-rays ordered, taken in the office, and reviewed today  Site: Right ankle xray  Indication: Right bimalleolar ORIF follow-up  View: Right tib-fib AP and lateral view(s)  Findings: Stable intact right distal fibula hardware.  Distal fibula   fracture is stable when compared to previous films.  Syndesmotic screw is   in place without evidence of hardware complication or loosening.  Right   distal tibia screw is intact.  Right distal tibia fracture is stable  Prior studies available for comparison: yes                 Assessment and Plan   Diagnoses and all orders for this visit:    1. S/P ORIF (open reduction internal fixation) right ankle bimalleolar fracture (Primary)    2. Right ankle pain, unspecified chronicity    3. Closed bimalleolar fracture of right ankle with routine healing, subsequent encounter  -     Cancel: XR Ankle 3+ View Right  -     XR Tibia Fibula 2 View Right         Elisha BIB Patten presents today 2 weeks postop right ankle open reduction internal fixation with syndesmotic screw placement performed by Dr. Vernon on 1/15/25 .  Sutures removed today without complications.  Incision is well-healing.  No active drainage or redness noted.  No concerning signs for infection.  Patient denies fever or chills.  Patient was placed in a well-padded short leg cast today without complications. Cast care education provided.  Patient is instructed to remain nonweightbearing. We discussed using ice and elevation to help with inflammation.  Patient  will perform toe and knee range of motion exercises. Focus on straight leg raises and working quad to maintain muscle strength.    Patient will follow up in 4 weeks for reevaluation.    We will obtain new x-rays of the right ankle without the cast at next visit.    Call or return if symptoms worsen or patient has any concerns.        Follow Up   Return in about 1 month (around 2/28/2025).  There are no Patient Instructions on file for this visit.    Patient was given instructions and counseling regarding her condition or for health maintenance advice. Please see specific information pulled into the AVS if appropriate.     Dictated Utilizing Dragon Dictation. Please note that portions of this note were completed with a voice recognition program. Part of this note may be an electronic transcription/translation of spoken language to printed text using the Dragon Dictation System.

## 2025-02-27 ENCOUNTER — OFFICE VISIT (OUTPATIENT)
Dept: ORTHOPEDIC SURGERY | Facility: CLINIC | Age: 25
End: 2025-02-27
Payer: COMMERCIAL

## 2025-02-27 VITALS — WEIGHT: 194 LBS | HEIGHT: 62 IN | BODY MASS INDEX: 35.7 KG/M2

## 2025-02-27 DIAGNOSIS — Z87.81 S/P ORIF (OPEN REDUCTION INTERNAL FIXATION) FRACTURE: ICD-10-CM

## 2025-02-27 DIAGNOSIS — M25.571 RIGHT ANKLE PAIN, UNSPECIFIED CHRONICITY: Primary | ICD-10-CM

## 2025-02-27 DIAGNOSIS — Z98.890 S/P ORIF (OPEN REDUCTION INTERNAL FIXATION) FRACTURE: ICD-10-CM

## 2025-02-27 NOTE — PROGRESS NOTES
"Chief Complaint  Follow-up and Pain of the Right Ankle    Subjective      Elisha Patten presents to [facility] for follow up of their right ankle.  She is 6 weeks status post bimalleolar fracture and open reduction fixation of the right ankle with syndesmotic screw placement form Dr. Vernon on 1/15/2025.  She has been in a right ankle cast since her last office visit.  She states she has no pain in her ankle.  She has been trying to move her toes frequently.  She has been trying to keep her ankle elevated.  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    No Known Allergies    Objective     Vital Signs:   Vitals:    02/27/25 1603   Weight: 88 kg (194 lb)   Height: 157.5 cm (62\")     Body mass index is 35.48 kg/m².    I reviewed the patient's chief complaint, history of present illness, review of systems, past medical history, surgical history, family history, social history, medications, and allergy list.     Ortho Exam  General: Alert. No acute distress.   Right lower extremity: Short leg cast removed today.  Incision is well-healing, no swelling, erythema, drainage, or signs of infection.  Mild ankle swelling.  Mild tenderness with palpation over the midfoot, hindfoot and forefoot.  Mild tenderness with palpation over the medial and lateral malleolus. Calf soft, nontender.  Demonstrates intact active dorsiflexion and plantarflexion of the ankle with mild ankle stiffness. Demonstrates intact active toe flexion and extension with no significant pain. Sensation intact. Palpable pedal pulse. Less than 2-second capillary refill.      Orthopedic Injury Treatment    Date/Time: 2/27/2025 4:47 PM    Performed by: Genevieve Rios MA  Authorized by: Anette Luis APRN  Injury location: ankle  Location details: right ankle  Injury type: fracture  Pre-procedure neurovascular assessment: neurovascularly " intact    Anesthesia:  Local anesthesia used: no    Sedation:  Patient sedated: no    Immobilization: cast (short leg)  Supplies used: Fiberglass.  Post-procedure neurovascular assessment: post-procedure neurovascularly intact  Patient tolerance: patient tolerated the procedure well with no immediate complications  Comments: Closed treatment was obtained and fiberglass cast was applied.  The patient tolerated the procedure without any complications.             Imaging Results (Most Recent)       Procedure Component Value Units Date/Time    XR Tibia Fibula 2 View Right [[id number]] Resulted: 02/27/25 1627     Updated: 02/27/25 1627    Narrative:      X-Ray Report:  Study: X-rays ordered, taken in the office, and reviewed today  Site: Right ankle xray  Indication: Right distal fibula and distal tibia fracture  View: AP, lateral right ankle view(s)  Findings: Stable right distal fibula fracture.  Interval healing seen.    Hardware is intact without evidence of complications or loosening.    Syndesmotic screws in place.  Distal tibia fracture is well aligned and   stable.  Prior studies available for comparison: yes                 Assessment and Plan   Diagnoses and all orders for this visit:    1. Right ankle pain, unspecified chronicity (Primary)  -     XR Tibia Fibula 2 View Right    2. S/P ORIF (open reduction internal fixation) right ankle bimalleolar fracture with syndesmotic screw placement    Other orders  -     Orthopedic Injury Treatment         Elisha Patten presents today to [facility] for the follow up of their right ankle.  She is 6 weeks status post right bimalleolar fracture and open reduction total fixation with right syndesmotic screw placement performed by Dr. Vernon on 1/15/2025.  Short leg cast was removed today and x-rays obtained.  Fracture stable and well-healing.  Syndesmotic screws still in place.  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  We discussed  return to work and restrictions which would include seated work only.  We discussed the possibility of going into a walking boot versus cast.  Patient is to remain nonweightbearing until syndesmotic screw is removed the OR at approximately 10 to 12 weeks postoperatively.  Patient still has approximately 1 month of healing left ago before we could consider going back to the OR.  Since patient is going to return to work with restrictions a short leg cast was placed back on her ankle.  She is to continue to keep the cast clean and dry.  She is advised to continue elevating as needed.    Follow up 4 weeks.  Will obtain x-rays of the right ankle outside of the cast at that time.  Will plan to transition into tall cam boot and schedule syndesmotic screw removal at that visit.        Tobacco Use: Low Risk  (2/27/2025)    Patient History     Smoking Tobacco Use: Never     Smokeless Tobacco Use: Never     Passive Exposure: Never     Patient reports that they are a nonsmoker; cessation education not applicable.           Follow Up   No follow-ups on file.  There are no Patient Instructions on file for this visit.    Patient was given instructions and counseling regarding her condition or for health maintenance advice. Please see specific information pulled into the AVS if appropriate.     Dictated Utilizing Dragon Dictation. Please note that portions of this note were completed with a voice recognition program. Part of this note may be an electronic transcription/translation of spoken language to printed text using the Dragon Dictation System.

## 2025-04-01 ENCOUNTER — PREP FOR SURGERY (OUTPATIENT)
Dept: OTHER | Facility: HOSPITAL | Age: 25
End: 2025-04-01
Payer: COMMERCIAL

## 2025-04-01 ENCOUNTER — OFFICE VISIT (OUTPATIENT)
Dept: ORTHOPEDIC SURGERY | Facility: CLINIC | Age: 25
End: 2025-04-01
Payer: COMMERCIAL

## 2025-04-01 VITALS
SYSTOLIC BLOOD PRESSURE: 133 MMHG | HEART RATE: 91 BPM | OXYGEN SATURATION: 96 % | BODY MASS INDEX: 35.7 KG/M2 | WEIGHT: 194 LBS | DIASTOLIC BLOOD PRESSURE: 89 MMHG | HEIGHT: 62 IN

## 2025-04-01 DIAGNOSIS — M25.571 RIGHT ANKLE PAIN, UNSPECIFIED CHRONICITY: ICD-10-CM

## 2025-04-01 DIAGNOSIS — Z98.890 S/P ORIF (OPEN REDUCTION INTERNAL FIXATION) FRACTURE: Primary | ICD-10-CM

## 2025-04-01 DIAGNOSIS — Z87.81 S/P ORIF (OPEN REDUCTION INTERNAL FIXATION) FRACTURE: Primary | ICD-10-CM

## 2025-04-01 NOTE — H&P (VIEW-ONLY)
"Chief Complaint  Follow-up of the Right Ankle    Subjective      Elisha Patten presents to [facility]     History of Present Illness  She is here today following up on her right ankle.  She is 10 weeks status post bimalleolar fracture and open reduction to fixation of the right ankle with syndesmotic screw placement by Dr. Vernon on 1/15/2025.  During her last office visit on 2/27/2025 she had a short leg cast reapplied, and return to work note was given.    She reports a satisfactory recovery, with no significant issues. She has resumed working on the floor, wearing her boot and is managing well. She anticipates a return to full-time work in 04/12/2025. She expresses concern about potential post-operative soreness in her ankle, given that she will be returning to work 3 days possibly after having screw removed and is wondering if she can have that screw removed sooner. She is interested in exploring home-based exercises to aid her recovery and is curious about the necessity of physical therapy. She also seeks clarification on whether there will be any post-surgical restrictions.         No Known Allergies    Objective     Vital Signs:   Vitals:    04/01/25 0837   BP: 133/89   Pulse: 91   SpO2: 96%   Weight: 88 kg (194 lb)   Height: 157.5 cm (62.01\")     Body mass index is 35.47 kg/m².    I reviewed the patient's chief complaint, history of present illness, review of systems, past medical history, surgical history, family history, social history, medications, and allergy list.     Ortho Exam  General: Alert. No acute distress.   Right lower extremity: Cast removed today.  Incision well-healing.  Mild ankle swelling.  Mild tenderness with palpation over the midfoot, hindfoot and forefoot.  Mild tenderness with palpation over the medial and lateral malleolus. Calf soft, nontender.  Demonstrates intact active dorsiflexion and plantarflexion of the ankle with mild associated stiffness. " Demonstrates intact active toe flexion and extension with mild pain. Sensation intact. Palpable pedal pulse. Less than 2-second capillary refill.                Imaging Results (Most Recent)       Procedure Component Value Units Date/Time    XR Ankle 2 View Right [[id number]] Resulted: 04/01/25 1200     Updated: 04/01/25 1200    Narrative:      X-Ray Report:  Study: X-rays ordered, taken in the office, and reviewed today  Site: Right ankle xray  Indication: Right distal fibula ORIF follow-up with syndesmotic screw   placement  View: AP, lateral right ankle view(s)  Findings: Stable and intact right distal fibula fracture mild displacement   on the lateral view still seen but stable when compared to previous films.    Medial malleolus hardware is intact and fracture is well-healing.    Syndesmotic screw is intact.  Prior studies available for comparison: yes              Results           Assessment and Plan   Diagnoses and all orders for this visit:    1. S/P ORIF (open reduction internal fixation) fracture (Primary)  -     XR Ankle 2 View Right    2. Right ankle pain, unspecified chronicity         Assessment & Plan  1. Ankle status post surgery.  The healing process is progressing well, with no complications observed. The plan is to schedule the screw removal procedure within the next 2 weeks, likely next Wednesday.  Did discuss possible removal of the screw tomorrow with Dr. Vernon.  Advised the patient that typically we like with her syndesmotic screws to be in place for approximately 12 weeks before removal however patient is anxious about returning to work required full duty on 4/12.  She has been advised to wear a compression sock to manage swelling and to apply ice during rest periods. Home exercises, such as writing the alphabet with her foot, have been recommended. She will continue to use the boot for an additional 2 weeks post-screw removal. Physical therapy is not deemed necessary at this point, unless  she feels like she is not able to progress with her home exercises. Post-surgery, she will be required to wear the boot during work hours. Pain management will be achieved through the use of Tylenol and ibuprofen. The incision site should be covered with an ABD pad or Telfa, followed by a compression sock. The area can be cleaned with soap and water, but soaking in the bathtub is discouraged. Light exfoliation is permitted, but caution should be exercised due to the presence of granulation tissue. Lotion application is allowed, but excessive soaking should be avoided to prevent skin maceration.    Discussed right ankle syndesmotic screw removal surgery with the patient. Risks/benefits discussed with patient including, but not limited to: infection, bleeding, neurovascular damage, malunion, nonunion, aesthetic deformity, need for further surgery, and death. Patient understands and desires to proceed. Surgery pamphlet provided to patient. Patient is to meet with our surgery scheduler at check out and proceed with scheduling of surgery.     All questions and concerns were addressed and answered. Patient left the office without any additional questions.     Follow-up  The patient will follow up in 2 weeks post-screw removal.    Tobacco Use: Low Risk  (4/1/2025)    Patient History     Smoking Tobacco Use: Never     Smokeless Tobacco Use: Never     Passive Exposure: Never     Patient reports that they are a nonsmoker; cessation education not applicable.           Follow Up   No follow-ups on file.  There are no Patient Instructions on file for this visit.    Patient was given instructions and counseling regarding her condition or for health maintenance advice. Please see specific information pulled into the AVS if appropriate.     Patient or patient representative verbalized consent for the use of Ambient Listening during the visit with  MAURY Sullivan for chart documentation. 4/1/2025  12:22 EDT    Dictated  Utilizing Dragon Dictation. Please note that portions of this note were completed with a voice recognition program. Part of this note may be an electronic transcription/translation of spoken language to printed text using the Dragon Dictation System.

## 2025-04-01 NOTE — PROGRESS NOTES
"Chief Complaint  Follow-up of the Right Ankle    Subjective      Elisha Patten presents to [facility]     History of Present Illness  She is here today following up on her right ankle.  She is 10 weeks status post bimalleolar fracture and open reduction to fixation of the right ankle with syndesmotic screw placement by Dr. Vernon on 1/15/2025.  During her last office visit on 2/27/2025 she had a short leg cast reapplied, and return to work note was given.    She reports a satisfactory recovery, with no significant issues. She has resumed working on the floor, wearing her boot and is managing well. She anticipates a return to full-time work in 04/12/2025. She expresses concern about potential post-operative soreness in her ankle, given that she will be returning to work 3 days possibly after having screw removed and is wondering if she can have that screw removed sooner. She is interested in exploring home-based exercises to aid her recovery and is curious about the necessity of physical therapy. She also seeks clarification on whether there will be any post-surgical restrictions.         No Known Allergies    Objective     Vital Signs:   Vitals:    04/01/25 0837   BP: 133/89   Pulse: 91   SpO2: 96%   Weight: 88 kg (194 lb)   Height: 157.5 cm (62.01\")     Body mass index is 35.47 kg/m².    I reviewed the patient's chief complaint, history of present illness, review of systems, past medical history, surgical history, family history, social history, medications, and allergy list.     Ortho Exam  General: Alert. No acute distress.   Right lower extremity: Cast removed today.  Incision well-healing.  Mild ankle swelling.  Mild tenderness with palpation over the midfoot, hindfoot and forefoot.  Mild tenderness with palpation over the medial and lateral malleolus. Calf soft, nontender.  Demonstrates intact active dorsiflexion and plantarflexion of the ankle with mild associated stiffness. " Demonstrates intact active toe flexion and extension with mild pain. Sensation intact. Palpable pedal pulse. Less than 2-second capillary refill.                Imaging Results (Most Recent)       Procedure Component Value Units Date/Time    XR Ankle 2 View Right [[id number]] Resulted: 04/01/25 1200     Updated: 04/01/25 1200    Narrative:      X-Ray Report:  Study: X-rays ordered, taken in the office, and reviewed today  Site: Right ankle xray  Indication: Right distal fibula ORIF follow-up with syndesmotic screw   placement  View: AP, lateral right ankle view(s)  Findings: Stable and intact right distal fibula fracture mild displacement   on the lateral view still seen but stable when compared to previous films.    Medial malleolus hardware is intact and fracture is well-healing.    Syndesmotic screw is intact.  Prior studies available for comparison: yes              Results           Assessment and Plan   Diagnoses and all orders for this visit:    1. S/P ORIF (open reduction internal fixation) fracture (Primary)  -     XR Ankle 2 View Right    2. Right ankle pain, unspecified chronicity         Assessment & Plan  1. Ankle status post surgery.  The healing process is progressing well, with no complications observed. The plan is to schedule the screw removal procedure within the next 2 weeks, likely next Wednesday.  Did discuss possible removal of the screw tomorrow with Dr. Vernon.  Advised the patient that typically we like with her syndesmotic screws to be in place for approximately 12 weeks before removal however patient is anxious about returning to work required full duty on 4/12.  She has been advised to wear a compression sock to manage swelling and to apply ice during rest periods. Home exercises, such as writing the alphabet with her foot, have been recommended. She will continue to use the boot for an additional 2 weeks post-screw removal. Physical therapy is not deemed necessary at this point, unless  she feels like she is not able to progress with her home exercises. Post-surgery, she will be required to wear the boot during work hours. Pain management will be achieved through the use of Tylenol and ibuprofen. The incision site should be covered with an ABD pad or Telfa, followed by a compression sock. The area can be cleaned with soap and water, but soaking in the bathtub is discouraged. Light exfoliation is permitted, but caution should be exercised due to the presence of granulation tissue. Lotion application is allowed, but excessive soaking should be avoided to prevent skin maceration.    Discussed right ankle syndesmotic screw removal surgery with the patient. Risks/benefits discussed with patient including, but not limited to: infection, bleeding, neurovascular damage, malunion, nonunion, aesthetic deformity, need for further surgery, and death. Patient understands and desires to proceed. Surgery pamphlet provided to patient. Patient is to meet with our surgery scheduler at check out and proceed with scheduling of surgery.     All questions and concerns were addressed and answered. Patient left the office without any additional questions.     Follow-up  The patient will follow up in 2 weeks post-screw removal.    Tobacco Use: Low Risk  (4/1/2025)    Patient History     Smoking Tobacco Use: Never     Smokeless Tobacco Use: Never     Passive Exposure: Never     Patient reports that they are a nonsmoker; cessation education not applicable.           Follow Up   No follow-ups on file.  There are no Patient Instructions on file for this visit.    Patient was given instructions and counseling regarding her condition or for health maintenance advice. Please see specific information pulled into the AVS if appropriate.     Patient or patient representative verbalized consent for the use of Ambient Listening during the visit with  MAURY Sullivan for chart documentation. 4/1/2025  12:22 EDT    Dictated  Utilizing Dragon Dictation. Please note that portions of this note were completed with a voice recognition program. Part of this note may be an electronic transcription/translation of spoken language to printed text using the Dragon Dictation System.

## 2025-04-01 NOTE — PRE-PROCEDURE INSTRUCTIONS
IMPORTANT INSTRUCTIONS - PRE-ADMISSION TESTING  DO NOT EAT OR CHEW anything after midnight the night before your procedure.    NPO AFTER MN EXCEPT SIPS OF WATER TO TAKE MEDICATIONS LISTED BELOW  Take the following medications the morning of your procedure with JUST A SIP OF WATER:  NONE    DO NOT BRING your medications to the hospital with you, UNLESS something has changed since your PRE-Admission Testing appointment.  Hold all vitamins, supplements, and NSAIDS (Non- steroidal anti-inflammatory meds) for one week prior to surgery (you MAY take Tylenol or Acetaminophen).  If you are diabetic, check your blood sugar the morning of your procedure. If it is less than 70 or if you are feeling symptomatic, call the following number for further instructions: _______.  Use your inhalers/nebulizers as usual, the morning of your procedure. BRING YOUR INHALERS with you.   Bring your CPAP or BIPAP to hospital, ONLY IF YOU WILL BE SPENDING THE NIGHT.   Make sure you have a ride home and have someone who will stay with you the day of your procedure after you go home.  If you have any questions, please call your Pre-Admission Testing Nurse, ___NENO_____________ at [phone]____________.   Per anesthesia request, do not smoke for 24 hours before your procedure or as instructed by your surgeon.    WILL CALL ON   4/1/25      NORMALLY BETWEEN 1 AND 4 PM TO GIVE OFFICIAL ARRIVAL TIME FOR DAY OF PROCEDURE  BATHING INSTRUCTIONS GIVEN. NO JEWELRY OF ANY TYPE OR NAIL POLISH UPPER OR LOWER EXT DAY OF PROCEDURE  COME TO ENTRANCE C [facility] MAIN DOOR DAY OF PROCEDURE  CASH,  OR CARD FOR MEDS TO BED IF INDICATED AT DISCHARGE

## 2025-04-02 ENCOUNTER — APPOINTMENT (OUTPATIENT)
Dept: GENERAL RADIOLOGY | Facility: HOSPITAL | Age: 25
End: 2025-04-02
Payer: COMMERCIAL

## 2025-04-02 ENCOUNTER — HOSPITAL ENCOUNTER (OUTPATIENT)
Facility: HOSPITAL | Age: 25
Setting detail: HOSPITAL OUTPATIENT SURGERY
Discharge: HOME | End: 2025-04-02
Attending: ORTHOPAEDIC SURGERY | Admitting: ORTHOPAEDIC SURGERY
Payer: COMMERCIAL

## 2025-04-02 ENCOUNTER — ANESTHESIA (OUTPATIENT)
Dept: PERIOP | Facility: HOSPITAL | Age: 25
End: 2025-04-02
Payer: COMMERCIAL

## 2025-04-02 ENCOUNTER — ANESTHESIA EVENT (OUTPATIENT)
Dept: PERIOP | Facility: HOSPITAL | Age: 25
End: 2025-04-02
Payer: COMMERCIAL

## 2025-04-02 VITALS
HEIGHT: 63 IN | WEIGHT: 196.87 LBS | OXYGEN SATURATION: 100 % | DIASTOLIC BLOOD PRESSURE: 88 MMHG | BODY MASS INDEX: 34.88 KG/M2 | TEMPERATURE: 96.9 F | SYSTOLIC BLOOD PRESSURE: 128 MMHG | RESPIRATION RATE: 16 BRPM | HEART RATE: 85 BPM

## 2025-04-02 DIAGNOSIS — M25.571 RIGHT ANKLE PAIN, UNSPECIFIED CHRONICITY: ICD-10-CM

## 2025-04-02 DIAGNOSIS — Z98.890 S/P ORIF (OPEN REDUCTION INTERNAL FIXATION) FRACTURE: ICD-10-CM

## 2025-04-02 DIAGNOSIS — Z87.81 S/P ORIF (OPEN REDUCTION INTERNAL FIXATION) FRACTURE: ICD-10-CM

## 2025-04-02 LAB — B-HCG UR QL: NEGATIVE

## 2025-04-02 PROCEDURE — 20680 REMOVAL OF IMPLANT DEEP: CPT | Performed by: ORTHOPAEDIC SURGERY

## 2025-04-02 PROCEDURE — 25010000002 FENTANYL CITRATE (PF) 50 MCG/ML SOLUTION: Performed by: NURSE ANESTHETIST, CERTIFIED REGISTERED

## 2025-04-02 PROCEDURE — 76000 FLUOROSCOPY <1 HR PHYS/QHP: CPT

## 2025-04-02 PROCEDURE — 81025 URINE PREGNANCY TEST: CPT | Performed by: ANESTHESIOLOGY

## 2025-04-02 PROCEDURE — 25010000002 DEXAMETHASONE PER 1 MG: Performed by: NURSE ANESTHETIST, CERTIFIED REGISTERED

## 2025-04-02 PROCEDURE — 25010000002 MIDAZOLAM PER 1MG: Performed by: ANESTHESIOLOGY

## 2025-04-02 PROCEDURE — 25010000002 ONDANSETRON PER 1 MG: Performed by: NURSE ANESTHETIST, CERTIFIED REGISTERED

## 2025-04-02 PROCEDURE — 25010000002 LIDOCAINE PF 2% 2 % SOLUTION: Performed by: NURSE ANESTHETIST, CERTIFIED REGISTERED

## 2025-04-02 PROCEDURE — 25010000002 CEFAZOLIN PER 500 MG

## 2025-04-02 PROCEDURE — 25810000003 LACTATED RINGERS PER 1000 ML: Performed by: ANESTHESIOLOGY

## 2025-04-02 PROCEDURE — 25010000002 KETOROLAC TROMETHAMINE PER 15 MG: Performed by: NURSE ANESTHETIST, CERTIFIED REGISTERED

## 2025-04-02 PROCEDURE — 25010000002 PROPOFOL 10 MG/ML EMULSION: Performed by: NURSE ANESTHETIST, CERTIFIED REGISTERED

## 2025-04-02 RX ORDER — PROPOFOL 10 MG/ML
VIAL (ML) INTRAVENOUS AS NEEDED
Status: DISCONTINUED | OUTPATIENT
Start: 2025-04-02 | End: 2025-04-02 | Stop reason: SURG

## 2025-04-02 RX ORDER — PROMETHAZINE HYDROCHLORIDE 25 MG/1
25 SUPPOSITORY RECTAL ONCE AS NEEDED
Status: DISCONTINUED | OUTPATIENT
Start: 2025-04-02 | End: 2025-04-02 | Stop reason: HOSPADM

## 2025-04-02 RX ORDER — OXYCODONE HYDROCHLORIDE 5 MG/1
5 TABLET ORAL
Refills: 0 | Status: DISCONTINUED | OUTPATIENT
Start: 2025-04-02 | End: 2025-04-02 | Stop reason: HOSPADM

## 2025-04-02 RX ORDER — ACETAMINOPHEN 500 MG
1000 TABLET ORAL ONCE
Status: COMPLETED | OUTPATIENT
Start: 2025-04-02 | End: 2025-04-02

## 2025-04-02 RX ORDER — DEXAMETHASONE SODIUM PHOSPHATE 4 MG/ML
INJECTION, SOLUTION INTRA-ARTICULAR; INTRALESIONAL; INTRAMUSCULAR; INTRAVENOUS; SOFT TISSUE AS NEEDED
Status: DISCONTINUED | OUTPATIENT
Start: 2025-04-02 | End: 2025-04-02 | Stop reason: SURG

## 2025-04-02 RX ORDER — LIDOCAINE HYDROCHLORIDE 20 MG/ML
INJECTION, SOLUTION EPIDURAL; INFILTRATION; INTRACAUDAL; PERINEURAL AS NEEDED
Status: DISCONTINUED | OUTPATIENT
Start: 2025-04-02 | End: 2025-04-02 | Stop reason: SURG

## 2025-04-02 RX ORDER — MIDAZOLAM HYDROCHLORIDE 2 MG/2ML
2 INJECTION, SOLUTION INTRAMUSCULAR; INTRAVENOUS ONCE
Status: COMPLETED | OUTPATIENT
Start: 2025-04-02 | End: 2025-04-02

## 2025-04-02 RX ORDER — SODIUM CHLORIDE, SODIUM LACTATE, POTASSIUM CHLORIDE, CALCIUM CHLORIDE 600; 310; 30; 20 MG/100ML; MG/100ML; MG/100ML; MG/100ML
9 INJECTION, SOLUTION INTRAVENOUS CONTINUOUS PRN
Status: DISCONTINUED | OUTPATIENT
Start: 2025-04-02 | End: 2025-04-02 | Stop reason: HOSPADM

## 2025-04-02 RX ORDER — HYDROCODONE BITARTRATE AND ACETAMINOPHEN 7.5; 325 MG/1; MG/1
1 TABLET ORAL EVERY 4 HOURS PRN
Qty: 12 TABLET | Refills: 0 | Status: SHIPPED | OUTPATIENT
Start: 2025-04-02

## 2025-04-02 RX ORDER — PROMETHAZINE HYDROCHLORIDE 12.5 MG/1
25 TABLET ORAL ONCE AS NEEDED
Status: DISCONTINUED | OUTPATIENT
Start: 2025-04-02 | End: 2025-04-02 | Stop reason: HOSPADM

## 2025-04-02 RX ORDER — KETOROLAC TROMETHAMINE 15 MG/ML
INJECTION, SOLUTION INTRAMUSCULAR; INTRAVENOUS AS NEEDED
Status: DISCONTINUED | OUTPATIENT
Start: 2025-04-02 | End: 2025-04-02 | Stop reason: SURG

## 2025-04-02 RX ORDER — ONDANSETRON 2 MG/ML
INJECTION INTRAMUSCULAR; INTRAVENOUS AS NEEDED
Status: DISCONTINUED | OUTPATIENT
Start: 2025-04-02 | End: 2025-04-02 | Stop reason: SURG

## 2025-04-02 RX ORDER — ONDANSETRON 2 MG/ML
4 INJECTION INTRAMUSCULAR; INTRAVENOUS ONCE AS NEEDED
Status: DISCONTINUED | OUTPATIENT
Start: 2025-04-02 | End: 2025-04-02 | Stop reason: HOSPADM

## 2025-04-02 RX ORDER — FENTANYL CITRATE 50 UG/ML
INJECTION, SOLUTION INTRAMUSCULAR; INTRAVENOUS AS NEEDED
Status: DISCONTINUED | OUTPATIENT
Start: 2025-04-02 | End: 2025-04-02 | Stop reason: SURG

## 2025-04-02 RX ADMIN — SODIUM CHLORIDE 2 G: 9 INJECTION, SOLUTION INTRAVENOUS at 11:32

## 2025-04-02 RX ADMIN — DEXAMETHASONE SODIUM PHOSPHATE 4 MG: 4 INJECTION, SOLUTION INTRAMUSCULAR; INTRAVENOUS at 11:34

## 2025-04-02 RX ADMIN — LIDOCAINE HYDROCHLORIDE 100 MG: 20 INJECTION, SOLUTION INTRAVENOUS at 11:28

## 2025-04-02 RX ADMIN — MIDAZOLAM HYDROCHLORIDE 2 MG: 1 INJECTION, SOLUTION INTRAMUSCULAR; INTRAVENOUS at 11:15

## 2025-04-02 RX ADMIN — ONDANSETRON 4 MG: 2 INJECTION INTRAMUSCULAR; INTRAVENOUS at 11:40

## 2025-04-02 RX ADMIN — PROPOFOL 200 MG: 10 INJECTION, EMULSION INTRAVENOUS at 11:30

## 2025-04-02 RX ADMIN — SODIUM CHLORIDE, SODIUM LACTATE, POTASSIUM CHLORIDE, CALCIUM CHLORIDE 9 ML/HR: 20; 30; 600; 310 INJECTION, SOLUTION INTRAVENOUS at 10:23

## 2025-04-02 RX ADMIN — FENTANYL CITRATE 50 MCG: 50 INJECTION, SOLUTION INTRAMUSCULAR; INTRAVENOUS at 11:38

## 2025-04-02 RX ADMIN — ACETAMINOPHEN 1000 MG: 500 TABLET, FILM COATED ORAL at 10:22

## 2025-04-02 RX ADMIN — FENTANYL CITRATE 50 MCG: 50 INJECTION, SOLUTION INTRAMUSCULAR; INTRAVENOUS at 11:28

## 2025-04-02 RX ADMIN — KETOROLAC TROMETHAMINE 30 MG: 15 INJECTION, SOLUTION INTRAMUSCULAR; INTRAVENOUS at 12:02

## 2025-04-02 NOTE — ANESTHESIA PREPROCEDURE EVALUATION
Anesthesia Evaluation     Patient summary reviewed and Nursing notes reviewed   no history of anesthetic complications:   NPO Solid Status: > 8 hours  NPO Liquid Status: > 2 hours           Airway   Mallampati: II  TM distance: >3 FB  Neck ROM: full  No difficulty expected  Dental      Pulmonary - negative pulmonary ROS and normal exam    breath sounds clear to auscultation  Cardiovascular - negative cardio ROS and normal exam  Exercise tolerance: good (4-7 METS)    Rhythm: regular  Rate: normal        Neuro/Psych- negative ROS  GI/Hepatic/Renal/Endo - negative ROS     Musculoskeletal (-) negative ROS    Abdominal    Substance History - negative use     OB/GYN negative ob/gyn ROS         Other - negative ROS       ROS/Med Hx Other: PAT Nursing Notes unavailable.               Anesthesia Plan    ASA 2     general     (Patient understands anesthesia not responsible for dental damage.)  intravenous induction     Anesthetic plan, risks, benefits, and alternatives have been provided, discussed and informed consent has been obtained with: patient.  Pre-procedure education provided  Plan discussed with CRNA.    CODE STATUS:

## 2025-04-02 NOTE — OP NOTE
HARDWARE REMOVAL  Procedure Report    Patient Name:  Elisha Patten  YOB: 2000    Date of Surgery:  4/2/2025     Indications: Retained syndesmotic ankle screw    Pre-op Diagnosis:   S/P ORIF (open reduction internal fixation) fracture [Z98.890, Z87.81]  Right ankle pain, unspecified chronicity [M25.571]    Post-Op Diagnosis Codes:     * S/P ORIF (open reduction internal fixation) fracture [Z98.890, Z87.81]     * Right ankle pain, unspecified chronicity [M25.571]     Procedure(s):  HARDWARE REMOVAL - SYNDESMOTIC SCREW REMOVAL RIGHT ANKLE         Staff:  Surgeon(s):  Nasim Vernon MD    Assistant: Diandra Santos    Anesthesia: Choice    Estimated Blood Loss: none    Implants:    Implant Name Type Inv. Item Serial No.  Lot No. LRB No. Used Action   SCRW DAVID S/TAP [id number] - [id number] Implant SCRW DAVID S/TAP [id number]  DEPHappy Hour Pal SYNTHES  Right 1 Explanted       Specimen:          None    Findings: Screw removed out difficulty    Complications: None    Description of Procedure: After obtaining form consent, patient brought the operating where undergoes anesthesia without complication.  Prepped and draped in sterile fashion.  Esmarch was used same with the limb tourniquet was inflated.  Incision was made.  Dissected down.  The screw head was identified.  Screwdriver then used to remove the syndesmotic screw.  C-arm fluoroscopy is used throughout the case.  This was thoroughly irrigated.  Closed in 2-0 Vicryl and 3-0 nylon.  Sterile dressing was applied after infiltration of local.  Tourniquet deflated.  Taken to cover in stable condition.    Assistant: Diandra Santos was responsible for performing the following activities: Retraction, Suction, Closing, and Placing Dressing and their skilled assistance was necessary for the success of this case.    Nasim Vernon MD     Date: 4/2/2025  Time: 12:10 EDT

## 2025-04-02 NOTE — DISCHARGE INSTRUCTIONS
DISCHARGE INSTRUCTIONS  ORTHOPEDICS      For your surgery you had:  General anesthesia (you may have a sore throat for the first 24 hours)  You may experience dizziness, drowsiness, or light-headedness for several hours following surgery  Do not stay alone today or tonight.  Limit your activity for 24 hours.  Resume your diet slowly.  Follow whatever special dietary instructions you may have been given by the doctor.  You should not drive or operate machinery or drink alcohol for 24 hours or while you are taking pain medication.  You should not sign any legally binding documents.  You may shower or bathe:    Sleep with the injured part elevated on a pillow.  Medications per physician's instructions as indicated on Discharge Medication Information Sheet.  Follow verbal instructions of your doctor.  Sit with the lower leg propped up on a footstool or chair with pillows.  Exercise toes for 10 minutes every hour while awake. Ice bag to injured area for 72 hours.  Apply 20 minutes on - 20 minutes off.  Never place ice directly on skin or cast.    Avoid getting cast or dressing wet.  In addition to these instructions, follow the discharge instructions on postoperative arthroscopic surgery.  SPECIAL INSTRUCTIONS:           Last dose of pain medication was given at:    NOTIFY THE PHYSICIAN IF YOU EXPERIENCE:  Numbness of toes.  Inability to move  toes.  Extreme coldness, paleness or blue dis-coloration  toes.  Excessive swelling of affected surgical site or swelling that causes the cast to rub or cut into skin.  Pain unrelieved by pain medication  Nausea/vomiting not relieved by prescribed medication  Unable to urinate in 6 hours after surgery  Temperature greater than 101 degree Fahrenheit or chills  If unable to reach your doctor, please go to the closest emergency room            DISCHARGE INSTRUCTIONS  ORTHOPEDICS      For your surgery you had:  General anesthesia (you may have a sore throat for the first 24 hours)  IV  sedation.  Local anesthesia  Monitored anesthesia care     You may experience dizziness, drowsiness, or light-headedness for several hours following surgery  Do not stay alone today or tonight.  Limit your activity for 24 hours.  Resume your diet slowly.  Follow whatever special dietary instructions you may have been given by the doctor.  You should not drive or operate machinery or drink alcohol for 24 hours or while you are taking pain medication.  You should not sign any legally binding documents.  If you had an axillary or regional block, you will not have control of the involved limb for up to 12 hours.  Protect the arm with a sling or follow your physician's specific instructions.  You may shower or bathe:    Sleep with the injured part elevated on a pillow.  Medications per physician's instructions as indicated on Discharge Medication Information Sheet.  Follow verbal instructions of your doctor.  Carry the upper arm in a sling so that the hand and wrist are above the level of the heart.  Sit with the lower leg propped up on a footstool or chair with pillows.  Exercise fingers or toes for 10 minutes every hour while awake. Ice bag to injured area for 72 hours.  Apply 20 minutes on - 20 minutes off.  Never place ice directly on skin or cast.    Avoid getting cast or dressing wet.  In addition to these instructions, follow the discharge instructions on postoperative arthroscopic surgery.  SPECIAL INSTRUCTIONS:           Last dose of pain medication was given at:    NOTIFY THE PHYSICIAN IF YOU EXPERIENCE:  Numbness of fingers or toes.  Inability to move fingers or toes.  Extreme coldness, paleness or blue dis-coloration of fingers or toes.  Excessive swelling of affected surgical site or swelling that causes the cast to rub or cut into skin.  Pain unrelieved by pain medication  Nausea/vomiting not relieved by prescribed medication  Unable to urinate in 6 hours after surgery  Temperature greater than 101 degree  Fahrenheit or chills  If unable to reach your doctor, please go to the closest emergency room    You should see KURT GODFREY for follow up care on APRIL 17, 2025 @ 2:15 PM     Phone number: [phone]

## 2025-04-03 NOTE — ANESTHESIA POSTPROCEDURE EVALUATION
Patient: Eilsha Patten    Procedure Summary       Date: 04/02/25 Room / Location: [facility] OR  / [facility]    Anesthesia Start: 1122 Anesthesia Stop: 1216    Procedure: HARDWARE REMOVAL - SYNDESMOTIC SCREW REMOVAL RIGHT ANKLE (Right) Diagnosis:       S/P ORIF (open reduction internal fixation) fracture      Right ankle pain, unspecified chronicity      (S/P ORIF (open reduction internal fixation) fracture [Z98.890, Z87.81])      (Right ankle pain, unspecified chronicity [M25.571])    Surgeons: Nasim Vernon MD Provider: Kiet Ch MD    Anesthesia Type: general ASA Status: 2            Anesthesia Type: general    Vitals  Vitals Value Taken Time   /88 04/02/25 12:34   Temp 36.1 °C (96.9 °F) 04/02/25 12:33   Pulse 88 04/02/25 12:42   Resp 16 04/02/25 12:13   SpO2 100 % 04/02/25 12:42   Vitals shown include unfiled device data.        Post Anesthesia Care and Evaluation    Patient location during evaluation: bedside  Patient participation: complete - patient participated  Level of consciousness: awake and alert  Pain management: adequate    Airway patency: patent  Anesthetic complications: No anesthetic complications  PONV Status: none  Cardiovascular status: acceptable  Respiratory status: acceptable  Hydration status: acceptable    Comments: An Anesthesiologist personally participated in the most demanding procedures (including induction and emergence if applicable) in the anesthesia plan, monitored the course of anesthesia administration at frequent intervals and remained physically present and available for immediate diagnosis and treatment of emergencies.

## 2025-04-10 ENCOUNTER — OFFICE VISIT (OUTPATIENT)
Dept: ORTHOPEDIC SURGERY | Facility: CLINIC | Age: 25
End: 2025-04-10
Payer: COMMERCIAL

## 2025-04-10 VITALS
BODY MASS INDEX: 34.88 KG/M2 | OXYGEN SATURATION: 97 % | DIASTOLIC BLOOD PRESSURE: 86 MMHG | WEIGHT: 196.87 LBS | HEART RATE: 90 BPM | SYSTOLIC BLOOD PRESSURE: 120 MMHG | HEIGHT: 63 IN

## 2025-04-10 DIAGNOSIS — M25.571 RIGHT ANKLE PAIN, UNSPECIFIED CHRONICITY: ICD-10-CM

## 2025-04-10 DIAGNOSIS — Z98.890 S/P ORIF (OPEN REDUCTION INTERNAL FIXATION) FRACTURE: ICD-10-CM

## 2025-04-10 DIAGNOSIS — Z87.81 S/P ORIF (OPEN REDUCTION INTERNAL FIXATION) FRACTURE: ICD-10-CM

## 2025-04-10 DIAGNOSIS — Z98.890 S/P HARDWARE REMOVAL: Primary | ICD-10-CM

## 2025-04-10 DIAGNOSIS — S82.841D CLOSED BIMALLEOLAR FRACTURE OF RIGHT ANKLE WITH ROUTINE HEALING, SUBSEQUENT ENCOUNTER: ICD-10-CM

## 2025-04-10 NOTE — PROGRESS NOTES
"Chief Complaint  Pain and Follow-up of the Right Ankle and Suture / Staple Removal    Subjective      Elisha Patten presents to [facility]     History of Present Illness  The patient is here today for a follow-up on her right ankle fracture. She is 12 weeks post-right bimalleolar fracture, open reduction, and internal fixation performed by  on 01/15/2025. She is approximately 2 weeks post-right syndesmotic screw removal. She is currently partial weightbearing and using a boot. 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    She reports an overall improvement in her condition, with no significant discomfort during ambulation. However, she experienced soreness in her heel after working for half a day on Wednesday, which she attributes to the way she was walking or possibly due to the use of the boot. She has been advised to continue wearing the boot at work. She expresses concern about her ability to bear additional weight while walking around the house and whether she should continue using the boot in such situations. She also inquires about the possibility of driving.      No Known Allergies    Objective     Vital Signs:   Vitals:    04/10/25 1507   BP: 120/86   Pulse: 90   SpO2: 97%   Weight: 89.3 kg (196 lb 13.9 oz)   Height: 160 cm (63\")     Body mass index is 34.87 kg/m².    I reviewed the patient's chief complaint, history of present illness, review of systems, past medical history, surgical history, family history, social history, medications, and allergy list.     Ortho Exam  General: Alert. No acute distress.   Right lower extremity: Sutures removed today without complications.  Incision is well-healing, no swelling, erythema, drainage, or signs of infection. Mild swelling.  Nontender with palpation over the midfoot, hindfoot and forefoot. Nontender with palpation over the medial and lateral malleolus. Calf soft, nontender.  Demonstrates intact " active dorsiflexion and plantarflexion of the ankle with Mild stiffness. Demonstrates intact active toe flexion and extension with Mild pain. Sensation intact. Palpable pedal pulse. Less than 2-second capillary refill.                Imaging Results (Most Recent)       Procedure Component Value Units Date/Time    XR Ankle 3+ View Right [[id number]] Resulted: 04/10/25 1618     Updated: 04/10/25 1618    Narrative:      X-Ray Report:  Study: X-rays ordered, taken in the office, and reviewed today  Site: Right ankle xray  Indication: Right ankle ORIF follow-up  View: AP/Lateral view(s)  Findings: Stable and intact right distal fibula fracture.  Small evidence   of mild displacement on the lateral view with some callus formation seen.    Syndesmotic screw has been removed without complication  Prior studies available for comparison: yes                   Assessment and Plan   Diagnoses and all orders for this visit:    1. S/P hardware removal - syndesmotic screw removal (Primary)    2. S/P ORIF (open reduction internal fixation) fracture    3. Right ankle pain, unspecified chronicity  -     XR Ankle 3+ View Right    4. Closed bimalleolar fracture of right ankle with routine healing, subsequent encounter         Assessment & Plan  1. Right ankle fracture.  The patient's right ankle fracture is demonstrating satisfactory healing progress. The hardware is providing necessary stabilization, but it may be contributing to some residual pain and swelling, which could persist for several months. The pain she experiences is likely due to ligament stiffness in the ankle. She is advised to continue wearing the boot for an additional 2 weeks while at work. At home, she may gradually transition out of the boot if comfortable. An ankle brace will be provided for additional support during this transition. She is encouraged to wear compression socks, particularly after resuming work, and to apply ice to manage swelling. Once the scabs  have healed, she may soak the area in an Epsom salt bath to alleviate swelling and pain. She is cleared to drive when she feels comfortable doing so. A full release to return to work has been issued as of today.    Follow-up  The patient will follow up in 6 to 8 weeks for another x-ray.    PROCEDURE  Open reduction and internal fixation of right bimalleolar fracture was performed on 01/15/2025.  Right syndesmotic screw removal was performed approximately 2 weeks ago.          Tobacco Use: Low Risk  (4/13/2025)    Patient History     Smoking Tobacco Use: Never     Smokeless Tobacco Use: Never     Passive Exposure: Never     Patient reports that they are a nonsmoker; cessation education not applicable.           Follow Up   Return in about 6 weeks (around 5/22/2025).  There are no Patient Instructions on file for this visit.    Patient was given instructions and counseling regarding her condition or for health maintenance advice. Please see specific information pulled into the AVS if appropriate.     Patient or patient representative verbalized consent for the use of Ambient Listening during the visit with  MAURY Sullivan for chart documentation. 4/13/2025  18:41 EDT    Dictated Utilizing Dragon Dictation. Please note that portions of this note were completed with a voice recognition program. Part of this note may be an electronic transcription/translation of spoken language to printed text using the Dragon Dictation System.

## 2025-05-22 ENCOUNTER — OFFICE VISIT (OUTPATIENT)
Dept: ORTHOPEDIC SURGERY | Facility: CLINIC | Age: 25
End: 2025-05-22
Payer: COMMERCIAL

## 2025-05-22 VITALS
DIASTOLIC BLOOD PRESSURE: 80 MMHG | SYSTOLIC BLOOD PRESSURE: 116 MMHG | OXYGEN SATURATION: 96 % | HEART RATE: 92 BPM | HEIGHT: 63 IN | WEIGHT: 196 LBS | BODY MASS INDEX: 34.73 KG/M2

## 2025-05-22 DIAGNOSIS — Z87.81 S/P ORIF (OPEN REDUCTION INTERNAL FIXATION) FRACTURE: Primary | ICD-10-CM

## 2025-05-22 DIAGNOSIS — Z98.890 S/P ORIF (OPEN REDUCTION INTERNAL FIXATION) FRACTURE: Primary | ICD-10-CM

## 2025-05-22 DIAGNOSIS — Z98.890 S/P HARDWARE REMOVAL: ICD-10-CM

## 2025-05-22 NOTE — PROGRESS NOTES
"Chief Complaint  Follow-up of the Right Ankle    Subjective      Elisha Patten presents to [facility]     History of Present Illness  The patient is here today following up on her right ankle.  She is 4-month status post bimalleolar fracture of the open reduction internal fixation of the right ankle performed Dr. Vernon on 1/15/2025.    She reports a general sense of well-being, with her work progressing satisfactorily. She has been utilizing a boot for support during her work hours but refrains from its use at home. Despite some residual apprehension, she feels robust and has resumed her normal activities at home. She experiences intermittent mild pain, which is self-limiting and does not necessitate medication. She also reports persistent swelling. She has an ankle brace but has not used it due to its incompatibility with her footwear. She plans to attempt working without the boot over the weekend, bringing it along as a contingency in case of pain. She notes that prolonged standing during her 12-hour shifts results in ankle stiffness upon returning home. She has discontinued the use of pain medication and requests its removal from her MyChart. She inquires about the possibility of resuming all activities, including running, albeit at a reduced intensity.           No Known Allergies    Objective     Vital Signs:   Vitals:    05/22/25 0959   BP: 116/80   BP Location: Left arm   Patient Position: Sitting   Cuff Size: Large Adult   Pulse: 92   SpO2: 96%   Weight: 88.9 kg (196 lb)   Height: 160 cm (62.99\")     Body mass index is 34.73 kg/m².    I reviewed the patient's chief complaint, history of present illness, review of systems, past medical history, surgical history, family history, social history, medications, and allergy list.     Ortho Exam    Right lower extremity: Mild swelling.  Nontender with palpation over the midfoot, hindfoot and forefoot. Nontender with palpation over the " medial and lateral malleolus. Calf soft, nontender.  Demonstrates intact active dorsiflexion and plantarflexion of the ankle with mild stiffness. Demonstrates intact active toe flexion and extension with mild pain. Sensation intact. Palpable pedal pulse. Less than 2-second capillary refill.      Physical Exam  General: Patient appears in no acute distress and is alert and oriented.    Musculoskeletal:  Right Ankle: Swelling present. New bone formation noted, providing stability. No signs of acute distress.            Imaging Results (Most Recent)       Procedure Component Value Units Date/Time    XR Ankle 2 View Right [[id number]] Resulted: 05/22/25 1342     Updated: 05/22/25 1342    Narrative:      X-Ray Report:  Study: X-rays ordered, taken in the office, and reviewed today  Site: Right ankle xray  Indication: Right bimalleolar ankle ORIF  View: AP/Lateral view(s)  Findings: Stable and intact fibula and medial malleolus fracture. Hardware   intact without evidence of loosening.   Prior studies available for comparison: yes              Results  Imaging   - X-ray of the ankle: New bone formation observed in the ankle, indicating normal stability.         Assessment and Plan   Diagnoses and all orders for this visit:    1. S/P ORIF (open reduction internal fixation) fracture (Primary)  -     XR Ankle 2 View Right       Assessment & Plan  1. S/P right ankle ORIF/Ankle pain:    Her condition is satisfactory, with no significant issues identified. The presence of swelling is anticipated for the next 6 to 8 months, particularly with physical activity. Xrays obtained showing stabilization of the previous fracture and hardware intact. Gradual discontinuation of the boot during work hours is advised as she feels comfortable. Post-activity swelling is expected. Use of an ankle compression sleeve or compression socks is suggested. A work note has been provided for convenience. Contact the office if any falls, twists, or  increased sensitivity occur, especially on the medial aspect of the ankle.     Follow-up:  Call if needed.    Follow Up   Return if symptoms worsen or fail to improve.  There are no Patient Instructions on file for this visit.    Patient was given instructions and counseling regarding her condition or for health maintenance advice. Please see specific information pulled into the AVS if appropriate.     Patient or patient representative verbalized consent for the use of Ambient Listening during the visit with  MAURY Sullivan for chart documentation. 5/26/2025  12:11 EDT    Dictated Utilizing Dragon Dictation. Please note that portions of this note were completed with a voice recognition program. Part of this note may be an electronic transcription/translation of spoken language to printed text using the Dragon Dictation System.

## 2025-06-27 ENCOUNTER — TELEPHONE (OUTPATIENT)
Dept: ORTHOPEDIC SURGERY | Facility: CLINIC | Age: 25
End: 2025-06-27

## 2025-06-27 NOTE — TELEPHONE ENCOUNTER
Caller: Elisha Patten    Relationship: Self    Best call back number:     What is the best time to reach you: ANY     Who are you requesting to speak with (clinical staff, provider,  specific staff member): CLINICAL    What was the call regarding: PATIENT HAD SOME QUESTIONS IN REGARDS TO FMLA     Is it okay if the provider responds through MyChart: PLEASE CALL

## (undated) DEVICE — PENCL SMOKE/EVAC MEGADYNE TELESCP 10FT

## (undated) DEVICE — COVER,C-ARM,41X74: Brand: MEDLINE

## (undated) DEVICE — EXTREMITY-LF: Brand: MEDLINE INDUSTRIES, INC.

## (undated) DEVICE — PROXIMATE RH ROTATING HEAD SKIN STAPLERS (35 WIDE) CONTAINS 35 STAINLESS STEEL STAPLES: Brand: PROXIMATE

## (undated) DEVICE — DISPOSABLE TOURNIQUET CUFF SINGLE BLADDER, SINGLE PORT AND QUICK CONNECT CONNECTOR: Brand: COLOR CUFF

## (undated) DEVICE — BNDG ELAS CO-FLEX SLF ADHR 6IN 5YD LF STRL

## (undated) DEVICE — CANNULATED DRILL

## (undated) DEVICE — GLV SURG BIOGEL LTX PF 8 1/2

## (undated) DEVICE — GAUZE,SPONGE,4"X4",16PLY,STRL,LF,10/TRAY: Brand: MEDLINE

## (undated) DEVICE — PAD GRND REM POLYHESIVE A/ DISP

## (undated) DEVICE — GLOVE,SURG,SENSICARE SLT,LF,PF,8.5: Brand: MEDLINE

## (undated) DEVICE — ANTIBACTERIAL VIOLET BRAIDED (POLYGLACTIN 910), SYNTHETIC ABSORBABLE SUTURE: Brand: COATED VICRYL

## (undated) DEVICE — BNDG ESMARK STRL 6INX12FT LF

## (undated) DEVICE — SYR LL TP 10ML STRL

## (undated) DEVICE — THREADED GUIDE WIRE

## (undated) DEVICE — DRSNG WND GZ CURAD OIL EMULSION 3X8IN LF STRL 1PK

## (undated) DEVICE — GLOVE,SURG,SENSICARE SLT,LF,PF,7: Brand: MEDLINE

## (undated) DEVICE — NEEDLE,22GX1.5",REG,BEVEL: Brand: MEDLINE

## (undated) DEVICE — STERILE POLYISOPRENE POWDER-FREE SURGICAL GLOVES WITH EMOLLIENT COATING: Brand: PROTEXIS

## (undated) DEVICE — VAGINAL PREP TRAY: Brand: MEDLINE INDUSTRIES, INC.

## (undated) DEVICE — UNDERCAST PADDING: Brand: DEROYAL

## (undated) DEVICE — DRESSING,GAUZE,XEROFORM,CURAD,1"X8",ST: Brand: CURAD

## (undated) DEVICE — GOWN,REINF,POLY,SIRUS,BRTH SLV,XLNG/XXL: Brand: MEDLINE

## (undated) DEVICE — INTENDED FOR TISSUE SEPARATION, AND OTHER PROCEDURES THAT REQUIRE A SHARP SURGICAL BLADE TO PUNCTURE OR CUT.: Brand: BARD-PARKER ® CARBON RIB-BACK BLADES

## (undated) DEVICE — SUT VIC 2/0 CT1 36IN

## (undated) DEVICE — BNDG ELAS ECON W/CLIP 4IN 5YD LF STRL